# Patient Record
Sex: FEMALE | Race: WHITE | NOT HISPANIC OR LATINO
[De-identification: names, ages, dates, MRNs, and addresses within clinical notes are randomized per-mention and may not be internally consistent; named-entity substitution may affect disease eponyms.]

---

## 2022-07-05 PROBLEM — Z00.00 ENCOUNTER FOR PREVENTIVE HEALTH EXAMINATION: Status: ACTIVE | Noted: 2022-07-05

## 2022-07-06 ENCOUNTER — NON-APPOINTMENT (OUTPATIENT)
Age: 62
End: 2022-07-06

## 2022-07-11 ENCOUNTER — APPOINTMENT (OUTPATIENT)
Dept: BREAST CENTER | Facility: CLINIC | Age: 62
End: 2022-07-11

## 2022-07-11 VITALS
DIASTOLIC BLOOD PRESSURE: 67 MMHG | SYSTOLIC BLOOD PRESSURE: 121 MMHG | HEIGHT: 64 IN | BODY MASS INDEX: 26.29 KG/M2 | WEIGHT: 154 LBS | OXYGEN SATURATION: 96 % | HEART RATE: 73 BPM

## 2022-07-11 DIAGNOSIS — Z80.3 FAMILY HISTORY OF MALIGNANT NEOPLASM OF BREAST: ICD-10-CM

## 2022-07-11 DIAGNOSIS — Z80.41 FAMILY HISTORY OF MALIGNANT NEOPLASM OF OVARY: ICD-10-CM

## 2022-07-11 PROCEDURE — 99204 OFFICE O/P NEW MOD 45 MIN: CPT

## 2022-07-11 RX ORDER — TOPIRAMATE 25 MG/1
25 TABLET, FILM COATED ORAL
Qty: 90 | Refills: 0 | Status: ACTIVE | COMMUNITY
Start: 2021-10-20

## 2022-07-11 RX ORDER — APIXABAN 5 MG/1
5 TABLET, FILM COATED ORAL
Qty: 180 | Refills: 0 | Status: ACTIVE | COMMUNITY
Start: 2022-03-14

## 2022-07-11 NOTE — PAST MEDICAL HISTORY
[Postmenopausal] : The patient is postmenopausal [Menarche Age ____] : age at menarche was [unfilled] [Menopause Age____] : age at menopause was [unfilled] [Total Preg ___] : G[unfilled] [Live Births ___] : P[unfilled]  [Age At Live Birth ___] : Age at live birth: [unfilled] [History of Hormone Replacement Treatment] : has no history of hormone replacement treatment [de-identified] : s/p TABBY/BSO for cervical cancer [id number]

## 2022-07-11 NOTE — HISTORY OF PRESENT ILLNESS
[FreeTextEntry1] : The patient is a 61-year-old G1, P1 postmenopausal white female of Ashkenazi Anabaptism descent.  She underwent menarche at age 13 and had her first child at age 34.  She underwent menopause when she had a radical TABBY/BSO in  at the time of her  when she had a significant stage III cervical cancer.  She was on Premarin for 5 years after the TABBY/BSO and then took tamoxifen for 5 years after that.  She has a strong family history of breast and ovarian cancer with her mother who  of ovarian cancer at age 45 and her maternal aunt who  from metastatic breast cancer at age 50.  The patient herself was diagnosed with a left breast upper outer quadrant breast cancer in  and underwent a left breast partial mastectomy and sentinel lymph node biopsy by Dr. Ramos at [facility] on 2013 for 1.6 cm moderately differentiated invasive duct cancer with 2 negative sentinel lymph nodes which was ER/MO strongly positive and HER2 positive by FISH with a Ki-67 of 25%.  She had a FISH ratio of 2.3 and a total HER2 count of 7.4.  The patient underwent chemotherapy at [facility] through Dr. Cotto and had Taxol with a full year of Herceptin but she refused any radiation therapy or endocrine therapy.  She then had an abnormal MRI on May 18, 2016 with a suspicious mass in the wide excision site in the left breast 1:00 region which was also seen on ultrasound is an 8 mm density and ultrasound core biopsy on May 18, 2016 showed a recurrent moderately differentiated invasive duct cancer which was again ER/MO positive and HER2 equivocal by IHC but turned out to be negative by FISH with a ratio of 1.3 and total HER2 count of 3.5.  She then tested BRCA2 positive with a 6174 deletion T mutation.  PET scan at that time showed no distant disease.  A mammography showed some other calcifications in the left breast but the patient decided to undergo bilateral mastectomies with bilateral nipple sparing technique with a prophylactic right breast mastectomy and direct to implant reconstruction with a repeat left axillary sentinel lymph node biopsy which was performed on 2016.  Final pathology showed 2 negative sentinel lymph nodes in the right breast was negative.  The left breast did show some foci of invasive duct cancer measuring 1 cm, 3 mm, and 1 cm respectively.  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  Some of the cancer was found in detached fragments which was on the anterior margin underneath the previous wide excision site.  I removed the further anterior margin which was completely negative so the final margins were all negative.  Final pathology showed the cancer to be ER/MO positive HER2 equivocal by IHC with a Ki-67 of 25% with a FISH ratio of 1.3 and total HER2 count of 3.5.  The patient saw Dr. Carrera at [facility] and an Oncotype Dx recurrence score was 22.  She underwent CMF chemotherapy which finished on 2016.  She later had her implants exchanged for cosmetic reasons with textured implants by Dr. Durán.  She was placed on toremifene.  She now comes in and underwent a right upper lobe lung resection for lung cancer by Dr. Zacarias Frye performed in .  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  In 2021, she was admitted with a TIA and vertigo and found to have a brain aneurysm.  She also had a short bout of A. fib and has been on Eliquis.  She had a brain aneurysm clipping at [facility] in 2022.  She has been seeing Dr. Watson at [facility] and has remained on toremifene since  and comes in now to discuss her endocrine therapy due to her recent history of TIA/stroke

## 2022-07-11 NOTE — REASON FOR VISIT
[Initial Eval - Existing Diagnosis] : an initial evaluation of an existing diagnosis [FreeTextEntry1] : The patient is of Ashkenazi Jehovah's witness descent with a family history of her mother who  of ovarian cancer at age 45 and maternal aunt who  of metastatic breast cancer at age 50.  The patient herself was diagnosed with a left breast upper outer quadrant breast cancer in  and underwent a partial mastectomy and sentinel lymph node biopsy in 2013 by Dr. Jo in [facility] for 1.6 cm moderately differentiated ductal cancer with 2 negative nodes which was triple positive for which she underwent chemotherapy with Taxol and a year of Herceptin at [facility] with Dr. Cotto.  She refused radiation therapy and endocrine therapy and eventually developed a recurrence in the left breast upper outer quadrant diagnosed after ultrasound-guided core biopsy in May 2016 and this cancer was ER/ND positive and HER2/valentino equivocal by IHC but negative by FISH.  She tested BRCA2 positive and underwent bilateral nipple sparing mastectomies with a prophylactic right breast mastectomy and repeat left axillary sentinel lymph node biopsy with direct to implant reconstruction on 2016.  Final pathology showed some residual foci of invasive duct cancer with the largest measuring 1 cm and 2 negative sentinel lymph nodes and Oncotype recurrence score was 22 and she was treated with CMF chemotherapy by Dr. Carrera at [facility].  She had her implants exchanged for cosmetic reasons by Dr. Durán and now comes in for interval follow-up.

## 2022-07-11 NOTE — ASSESSMENT
[FreeTextEntry1] : The patient is a 61-year-old G1, P1 postmenopausal white female of Ashkenazi Religion descent.  She underwent menarche at age 13 and had her first child at age 34.  She underwent menopause when she had a radical TABBY/BSO in  at the time of her  when she had a significant stage III cervical cancer.  She was on Premarin for 5 years after the TABBY/BSO and then took tamoxifen for 5 years after that.  She has a strong family history of breast and ovarian cancer with her mother who  of ovarian cancer at age 45 and her maternal aunt who  from metastatic breast cancer at age 50.  The patient herself was diagnosed with a left breast upper outer quadrant breast cancer in  and underwent a left breast partial mastectomy and sentinel lymph node biopsy by Dr. Ramos at [facility] on 2013 for 1.6 cm moderately differentiated invasive duct cancer with 2 negative sentinel lymph nodes which was ER/OH strongly positive and HER2 positive by FISH with a Ki-67 of 25%.  She had a FISH ratio of 2.3 and a total HER2 count of 7.4.  The patient underwent chemotherapy at [facility] through Dr. Cotto and had Taxol with a full year of Herceptin but she refused any radiation therapy or endocrine therapy.  She then had an abnormal MRI on May 18, 2016 with a suspicious mass in the wide excision site in the left breast 1:00 region which was also seen on ultrasound is an 8 mm density and ultrasound core biopsy on May 18, 2016 showed a recurrent moderately differentiated invasive duct cancer which was again ER/OH positive and HER2 equivocal by IHC but turned out to be negative by FISH with a ratio of 1.3 and total HER2 count of 3.5.  She then tested BRCA2 positive with a 6174 deletion T mutation.  PET scan at that time showed no distant disease.  A mammography showed some other calcifications in the left breast but the patient decided to undergo bilateral mastectomies with bilateral nipple sparing technique with a prophylactic right breast mastectomy and direct to implant reconstruction with a repeat left axillary sentinel lymph node biopsy which was performed on 2016.  Final pathology showed 2 negative sentinel lymph nodes in the right breast was negative.  The left breast did show some foci of invasive duct cancer measuring 1 cm, 3 mm, and 1 cm respectively.  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  Some of the cancer was found in detached fragments which was on the anterior margin underneath the previous wide excision site.  I removed the further anterior margin which was completely negative so the final margins were all negative.  Final pathology showed the cancer to be ER/OH positive HER2 equivocal by IHC with a Ki-67 of 25% with a FISH ratio of 1.3 and total HER2 count of 3.5.  The patient saw Dr. Carrera at [facility] and an Oncotype Dx recurrence score was 22.  She underwent CMF chemotherapy which finished on 2016.  She later had her implants exchanged for cosmetic reasons with textured implants by Dr. Durán.  She was placed on toremifene. She now comes in and underwent a right upper lobe lung resection for lung cancer by Dr. Zacarias Frye performed in .  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  In 2021, she was admitted with a TIA and vertigo and found to have a brain aneurysm.  She also had a short bout of A. fib and has been on Eliquis.  She had a brain aneurysm clipping at [facility] in 2022.  She has been seeing Dr. Watson at [facility] and has remained on toremifene since  and comes in now to discuss her endocrine therapy due to her recent history of TIA/stroke.  Dr. Watson sent out a breast index score which was high at 6.7.  He felt she needed to stay on endocrine therapy and wanted her to come off of the toremifene given the stroke history.  I would be in general agreement with this but the patient is absolutely refusing an AI.  The other option would be for her just to come off of the toremifene which she has been on for now 6 years.  The patient is very concerned about vaginal atrophy and she could possibly use an AI with some vaginal estrogen creams.  Overall, the patient is very anxious and I would like her to see another medical oncologist since she is losing confidence in Dr. Watson at [facility].  I have referred her to Heidi Gregory at [facility].  From a breast cancer standpoint she is doing really well and on exam has no suspicious findings over either implant with no evidence of recurrence over the left implant.  She does have textured implants and understands the increased risks of ALCL but does not want to undergo further surgery.  I can just see her again on a yearly basis and she will discuss her endocrine therapy issues with Dr. Heidi Gregory.

## 2022-07-11 NOTE — REVIEW OF SYSTEMS
[Feeling Tired] : feeling tired [Palpitations] : palpitations [Depression] : depression [Negative] : Neurological [de-identified] : Sure of TIA/stroke with brain aneurysm clipping

## 2022-08-31 ENCOUNTER — RESULT REVIEW (OUTPATIENT)
Age: 62
End: 2022-08-31

## 2022-08-31 ENCOUNTER — APPOINTMENT (OUTPATIENT)
Dept: HEMATOLOGY ONCOLOGY | Facility: CLINIC | Age: 62
End: 2022-08-31

## 2022-08-31 VITALS
SYSTOLIC BLOOD PRESSURE: 120 MMHG | WEIGHT: 150 LBS | RESPIRATION RATE: 18 BRPM | HEIGHT: 64 IN | DIASTOLIC BLOOD PRESSURE: 64 MMHG | TEMPERATURE: 97.9 F | HEART RATE: 69 BPM | BODY MASS INDEX: 25.61 KG/M2 | OXYGEN SATURATION: 96 %

## 2022-08-31 DIAGNOSIS — Z86.03 PERSONAL HISTORY OF NEOPLASM OF UNCERTAIN BEHAVIOR: ICD-10-CM

## 2022-08-31 DIAGNOSIS — Z85.3 PERSONAL HISTORY OF MALIGNANT NEOPLASM OF BREAST: ICD-10-CM

## 2022-08-31 DIAGNOSIS — Z85.41 PERSONAL HISTORY OF MALIGNANT NEOPLASM OF CERVIX UTERI: ICD-10-CM

## 2022-08-31 DIAGNOSIS — Z85.118 PERSONAL HISTORY OF OTHER MALIGNANT NEOPLASM OF BRONCHUS AND LUNG: ICD-10-CM

## 2022-08-31 DIAGNOSIS — Z87.891 PERSONAL HISTORY OF NICOTINE DEPENDENCE: ICD-10-CM

## 2022-08-31 PROCEDURE — 99205 OFFICE O/P NEW HI 60 MIN: CPT | Mod: 25

## 2022-08-31 PROCEDURE — 36415 COLL VENOUS BLD VENIPUNCTURE: CPT

## 2022-08-31 NOTE — ASSESSMENT
[FreeTextEntry1] : #Breast cancer\par 2013- left breast upper outer IDC; s/p partial mastectomy + sentinel lymph node biopsy Dr. Jo ([place])\par 1.6cm moderately differentiated IDC with 2 negative nodes, triple positive, s/p taxol + [place]ceptin [place]12 [place]par She refused XRT and endocrine therapy and eventually developed recurrence in the left breast upper outer quadrant\par 2016 ER/DC positive, HER 2 equivical by IHC, negative by FISH; BRCA positive with 6174 deletion T mutation\par s/p bilateral nipple sparing mastectomies, repeat left sentinel node biopsy, with direct to implant reconstruction\par Path: residual foci IDC, largest measuring 1cm and 2 negative nodes, oncotype 22\par s/p CMF Dr. Carrera ([place]) 12/2016,  patient not willing to take AI or tamoxifen and placed on toremifene \par s/p implant exchange Dr. Durán \par Patient refuses AI or Tamoxifen. She has been on Tormefine in the past. We have discussed that this is typically not the medication we use in the adjuvant setting and that this is used in the metastatic setting. We have also discussed that this increases her risk of VTE and TIA/stroke. She understands her risk and would still like to proceed. I have advised that she remain on Eliquis while she is on this medication. I do believe that given her history of ER+ breast cancer that she would benefit from Endocrine therapy and she completely understands the risks but wants to proceed with Tormefine. \par \par #BRCA\par MRI abd for Pancreatic eval - pending\par Breast MRI also pending\par Derm - had yearly skin eval May 2022\par S/p mastectomy and TAHBSO\par \par #lung cancer\par Stage I \par Dr. Frye\par \par # TIA likely 2/2 afib\par on eliquis\par \par Follow in 3 months with cbc with diff, cmp, Vit D

## 2022-08-31 NOTE — REASON FOR VISIT
[Initial Consultation] : an initial consultation [FreeTextEntry2] : endocrine therapy consultation following TIA

## 2022-08-31 NOTE — HISTORY OF PRESENT ILLNESS
[de-identified] : Ms. Roman is a 61 year old woman who presents for endocrine therapy consultation. Patient is on toremifene because she takes wellbutrin which is contraindicated with tamoxifen. Patient is seeing us today because her current oncologist does not want her on toremifene and wants to place her on an AI but patient is refusing. Patient states that last week she experienced her first migraine since her brain surgery and states that she left a note on her night stand for her daughter because the patient did not believe she would wake up. Patient states that she has PTSD due to her medical history. Patients goal is to be treated by Dr. Gregory Because she is passionate about her quality of life over her quantity. She wishes to be viewed as a whole person and is advocating to be placed back on toremifene. \par \par DEXA shows osteopenia- patient denied Fosamax \par \par Patient had a tumor removed from her chest cavity at 15. Cervical Ca dx 10 weeks into her 1st pregnancy at 34 years old. \par \par 2013- left breast upper outer IDC; s/p partial mastectomy + sentinel lymph node biopsy Dr. Jo ([place])\par 1.6cm moderately differentiated IDC with 2 negative nodes, triple positive, s/p taxol + herceptin x12 months Dr. Cotto ([facility]par She refused XRT and endocrine therapy and eventually developed recurrence in the left breast upper outer quadrant\par \par 2016 ER/WI positive, HER 2 equivical by IHC, negative by FISH; BRCA positive with 6174 deletion T mutation\par s/p bilateral nipple sparing mastectomies, repeat left sentinel node biopsy, with direct to implant reconstruction\par Path: residual foci IDC, largest measuring 1cm and 2 negative nodes, oncotype 22\par s/p CMF Dr. Carrera ([place]) 12/2016,  patient not willing to take AI or tamoxifen and placed on toremifene \par s/p implant exchange Dr. Durán \par \par 2020 s/p RUL resection Dr. Zacarias Frye\par \par 10/2021 admitted for TIA and vertigo given TPA but found to have brain aneurysm; placed on eliquis\par \par 2/2022 brain aneurysm clipping at [facility] followed by Dr. Lee -patient has 3 brain aneurysms total \par \par neurologist- essential hand tremor, intercostal neuralgia d/t sx's and scar tissue - on Topamax \par \par Age of Menarche: 13\par Age of Menaopause: s/p TABBY/BSO 1995 for stage III cervical cancer\par OCP/HRT: premarin x5 years post TABBY, tamoxifen x 5 years\par \par BRCA 2 positive

## 2022-08-31 NOTE — CONSULT LETTER
[Dear  ___] : Dear  [unfilled], [Consult Letter:] : I had the pleasure of evaluating your patient, [unfilled]. [Please see my note below.] : Please see my note below. [Consult Closing:] : Thank you very much for allowing me to participate in the care of this patient.  If you have any questions, please do not hesitate to contact me. [Sincerely,] : Sincerely, [FreeTextEntry3] : Heidi Gregory DO, FACJOSELITO, FACP\par Medical Oncology and Hematology\par [facility] [place]\par

## 2022-09-27 ENCOUNTER — NON-APPOINTMENT (OUTPATIENT)
Age: 62
End: 2022-09-27

## 2022-11-30 NOTE — ASSESSMENT
[FreeTextEntry1] : #Breast cancer\par 2013- left breast upper outer IDC; s/p partial mastectomy + sentinel lymph node biopsy Dr. Jo ([place])\par 1.6cm moderately differentiated IDC with 2 negative nodes, triple positive, s/p taxol + [place]12 [place]par She refused XRT and endocrine therapy and eventually developed recurrence in the left breast upper outer quadrant\par 2016 ER/GA positive, HER 2 equivical by IHC, negative by FISH; BRCA positive with 6174 deletion T mutation\par s/p bilateral nipple sparing mastectomies, repeat left sentinel node biopsy, with direct to implant reconstruction\par Path: residual foci IDC, largest measuring 1cm and 2 negative nodes, oncotype 22\par s/p CMF Dr. Carrera ([place]) 12/2016,  patient not willing to take AI or tamoxifen and placed on toremifene \par s/p implant exchange Dr. Durán \par Patient refuses AI or Tamoxifen. She has been on Tormefine in the past. We have discussed that this is typically not the medication we use in the adjuvant setting and that this is used in the metastatic setting. We have also discussed that this increases her risk of VTE and TIA/stroke. She understands her risk and would still like to proceed. I have advised that she remain on Eliquis while she is on this medication. I do believe that given her history of ER+ breast cancer that she would benefit from Endocrine therapy and she completely understands the risks but wants to proceed with Tormefine. \par \par #BRCA\par MRI abd for Pancreatic eval - pending\par Breast MRI also pending\par Derm - had yearly skin eval May 2022\par S/p mastectomy and TAHBSO\par \par #lung cancer\par Stage I \par Dr. Frye\par \par # TIA likely 2/2 afib\par on eliquis\par \par Follow in 3 months with cbc with diff, cmp, Vit D

## 2022-11-30 NOTE — CONSULT LETTER
[Dear  ___] : Dear  [unfilled], [Consult Letter:] : I had the pleasure of evaluating your patient, [unfilled]. [Please see my note below.] : Please see my note below. [Consult Closing:] : Thank you very much for allowing me to participate in the care of this patient.  If you have any questions, please do not hesitate to contact me. [Sincerely,] : Sincerely, [FreeTextEntry3] : Heidi Greogry DO, FACJOSELITO, FACP\par Medical Oncology and Hematology\par [facility] [place]\par

## 2022-11-30 NOTE — HISTORY OF PRESENT ILLNESS
[de-identified] : Ms. Roman is a 61 year old woman who presents for endocrine therapy consultation. Patient is on toremifene because she takes wellbutrin which is contraindicated with tamoxifen. Patient is seeing us today because her current oncologist does not want her on toremifene and wants to place her on an AI but patient is refusing. Patient states that last week she experienced her first migraine since her brain surgery and states that she left a note on her night stand for her daughter because the patient did not believe she would wake up. Patient states that she has PTSD due to her medical history. Patients goal is to be treated by Dr. Gregory Because she is passionate about her quality of life over her quantity. She wishes to be viewed as a whole person and is advocating to be placed back on toremifene. \par \par DEXA shows osteopenia- patient denied Fosamax \par \par Patient had a tumor removed from her chest cavity at 15. Cervical Ca dx 10 weeks into her 1st pregnancy at 34 years old. \par \par 2013- left breast upper outer IDC; s/p partial mastectomy + sentinel lymph node biopsy Dr. Jo ([place])\par 1.6cm moderately differentiated IDC with 2 negative nodes, triple positive, s/p taxol + [place]12 [place] [place] She refused XRT and endocrine therapy and eventually developed recurrence in the left breast upper outer quadrant\par \par 2016 ER/ID positive, HER 2 equivical by IHC, negative by FISH; BRCA positive with 6174 deletion T mutation\par s/p bilateral nipple sparing mastectomies, repeat left sentinel node biopsy, with direct to implant reconstruction\par Path: residual foci IDC, largest measuring 1cm and 2 negative nodes, oncotype 22\par s/p CMF Dr. Carrera ([place]) 12/2016,  patient not willing to take AI or tamoxifen and placed on toremifene \par s/p implant exchange Dr. Durán \par \par 2020 s/p RUL resection Dr. Zacarias Frye\par \par 10/2021 admitted for TIA and vertigo given TPA but found to have brain aneurysm; placed on eliquis\par \par 2/2022 brain aneurysm clipping at [facility] followed by Dr. Lee -patient has 3 brain aneurysms total \par \par neurologist- essential hand tremor, intercostal neuralgia d/t sx's and scar tissue - on Topamax \par \par Age of Menarche: 13\par Age of Menaopause: s/p TABBY/BSO 1995 for stage III cervical cancer\par OCP/HRT: premarin x5 years post TABBY, tamoxifen x 5 years\par \par BRCA 2 positive

## 2022-12-05 ENCOUNTER — APPOINTMENT (OUTPATIENT)
Dept: HEMATOLOGY ONCOLOGY | Facility: CLINIC | Age: 62
End: 2022-12-05

## 2022-12-23 ENCOUNTER — RESULT REVIEW (OUTPATIENT)
Age: 62
End: 2022-12-23

## 2022-12-23 ENCOUNTER — APPOINTMENT (OUTPATIENT)
Dept: HEMATOLOGY ONCOLOGY | Facility: CLINIC | Age: 62
End: 2022-12-23

## 2022-12-23 VITALS
WEIGHT: 147 LBS | BODY MASS INDEX: 25.1 KG/M2 | SYSTOLIC BLOOD PRESSURE: 118 MMHG | TEMPERATURE: 97.4 F | HEART RATE: 74 BPM | DIASTOLIC BLOOD PRESSURE: 54 MMHG | RESPIRATION RATE: 16 BRPM | HEIGHT: 64 IN | OXYGEN SATURATION: 98 %

## 2022-12-23 PROCEDURE — 99214 OFFICE O/P EST MOD 30 MIN: CPT | Mod: 25

## 2022-12-23 PROCEDURE — 36415 COLL VENOUS BLD VENIPUNCTURE: CPT

## 2023-01-06 NOTE — CONSULT LETTER
[Dear  ___] : Dear  [unfilled], [Consult Letter:] : I had the pleasure of evaluating your patient, [unfilled]. [Please see my note below.] : Please see my note below. [Consult Closing:] : Thank you very much for allowing me to participate in the care of this patient.  If you have any questions, please do not hesitate to contact me. [Sincerely,] : Sincerely, [FreeTextEntry3] : Heidi Gregory DO, FACJOSELITO, FACP\par Medical Oncology and Hematology\par [facility] [place]\par

## 2023-01-06 NOTE — HISTORY OF PRESENT ILLNESS
[de-identified] : Ms. Roman is a 61 year old woman who presents for endocrine therapy consultation. Patient is on toremifene because she takes wellbutrin which is contraindicated with tamoxifen. Patient is seeing us today because her current oncologist does not want her on toremifene and wants to place her on an AI but patient is refusing. Patient states that last week she experienced her first migraine since her brain surgery and states that she left a note on her night stand for her daughter because the patient did not believe she would wake up. Patient states that she has PTSD due to her medical history. Patients goal is to be treated by Dr. Gregory Because she is passionate about her quality of life over her quantity. She wishes to be viewed as a whole person and is advocating to be placed back on toremifene. \par \par DEXA shows osteopenia- patient denied Fosamax \par \par Patient had a tumor removed from her chest cavity at 15. Cervical Ca dx 10 weeks into her 1st pregnancy at 34 years old. \par \par 2013- left breast upper outer IDC; s/p partial mastectomy + sentinel lymph node biopsy Dr. Jo ([place])\par 1.6cm moderately differentiated IDC with 2 negative nodes, triple positive, s/p taxol + [place]12 [place] [place] She refused XRT and endocrine therapy and eventually developed recurrence in the left breast upper outer quadrant\par \par 2016 ER/WI positive, HER 2 equivical by IHC, negative by FISH; BRCA positive with 6174 deletion T mutation\par s/p bilateral nipple sparing mastectomies, repeat left sentinel node biopsy, with direct to implant reconstruction\par Path: residual foci IDC, largest measuring 1cm and 2 negative nodes, oncotype 22\par s/p CMF Dr. Carrera ([place]) 12/2016,  patient not willing to take AI or tamoxifen and placed on toremifene \par s/p implant exchange Dr. Durán \par \par 2020 s/p RUL resection Dr. Zacarias Frye\par \par 10/2021 admitted for TIA and vertigo given TPA but found to have brain aneurysm; placed on eliquis\par \par 2/2022 brain aneurysm clipping at [facility] followed by Dr. 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

## 2023-01-06 NOTE — ASSESSMENT
[FreeTextEntry1] : #Breast cancer\par 2013- left breast upper outer IDC; s/p partial mastectomy + sentinel lymph node biopsy Dr. Jo ([place])\par 1.6cm moderately differentiated IDC with 2 negative nodes, triple positive, s/p taxol + herceptin x12 months Dr. Cotto ([facility] [place]par She refused XRT and endocrine therapy and eventually developed recurrence in the left breast upper outer quadrant\par 2016 ER/PA positive, HER 2 equivical by IHC, negative by FISH; BRCA positive with 6174 deletion T mutation\par s/p bilateral nipple sparing mastectomies, repeat left sentinel node biopsy, with direct to implant reconstruction\par Path: residual foci IDC, largest measuring 1cm and 2 negative nodes, oncotype 22\par s/p CMF Dr. Carrera ([place]) 12/2016,  patient not willing to take AI or tamoxifen and placed on toremifene \par s/p implant exchange Dr. Durán \par Patient refuses AI or Tamoxifen. She has been on Tormefine in the past. We have discussed that this is typically not the medication we use in the adjuvant setting and that this is used in the metastatic setting. We have also discussed that this increases her risk of VTE and TIA/stroke. She understands her risk and would still like to proceed. I have advised that she remain on Eliquis while she is on this medication. I do believe that given her history of ER+ breast cancer that she would benefit from Endocrine therapy and she completely understands the risks but wants to proceed with Tormefine. \par continue tormefine. MRI breast 10/22 - reviewed- no evidence of malignancy. repeat due 10/2023.\par \par #BRCA 2\par MRI abd for Pancreatic eval - normal pancreas.  MRI abd - normal pancreas. No suspicious findings\par Breast MRI -MRI breast 10/22 - reviewed- no evidence of malignancy.\par Derm - had yearly skin eval May 2022 - advised to follow up again given growth on eyebrow for evaluation\par S/p mastectomy and TAHBSO\par \par #lung cancer\par Stage I \par Dr. Frye\par \par # TIA likely 2/2 afib\par on eliquis\par \par #osteoporosis\par taking ca++ and vit D\par does not want bisphosphonates (oral, SQ or IV)\par \par Follow in 3 months with cbc with diff, cmp, Vit D

## 2023-03-27 ENCOUNTER — APPOINTMENT (OUTPATIENT)
Dept: HEMATOLOGY ONCOLOGY | Facility: CLINIC | Age: 63
End: 2023-03-27
Payer: COMMERCIAL

## 2023-03-27 ENCOUNTER — RESULT REVIEW (OUTPATIENT)
Age: 63
End: 2023-03-27

## 2023-03-27 ENCOUNTER — APPOINTMENT (OUTPATIENT)
Dept: HEMATOLOGY ONCOLOGY | Facility: CLINIC | Age: 63
End: 2023-03-27

## 2023-03-27 VITALS
HEART RATE: 74 BPM | OXYGEN SATURATION: 98 % | DIASTOLIC BLOOD PRESSURE: 53 MMHG | WEIGHT: 147 LBS | BODY MASS INDEX: 25.1 KG/M2 | RESPIRATION RATE: 16 BRPM | TEMPERATURE: 97.6 F | HEIGHT: 64 IN | SYSTOLIC BLOOD PRESSURE: 120 MMHG

## 2023-03-27 VITALS
WEIGHT: 147 LBS | SYSTOLIC BLOOD PRESSURE: 120 MMHG | RESPIRATION RATE: 16 BRPM | HEIGHT: 64 IN | HEART RATE: 74 BPM | DIASTOLIC BLOOD PRESSURE: 53 MMHG | OXYGEN SATURATION: 98 % | TEMPERATURE: 97.6 F | BODY MASS INDEX: 25.1 KG/M2

## 2023-03-27 DIAGNOSIS — G45.9 TRANSIENT CEREBRAL ISCHEMIC ATTACK, UNSPECIFIED: ICD-10-CM

## 2023-03-27 PROCEDURE — 36415 COLL VENOUS BLD VENIPUNCTURE: CPT

## 2023-03-27 PROCEDURE — 99214 OFFICE O/P EST MOD 30 MIN: CPT | Mod: 25

## 2023-03-27 RX ORDER — ALBUTEROL SULFATE 90 UG/1
108 (90 BASE) INHALANT RESPIRATORY (INHALATION)
Qty: 8 | Refills: 0 | Status: ACTIVE | COMMUNITY
Start: 2023-03-14

## 2023-04-19 PROBLEM — G45.9 TIA (TRANSIENT ISCHEMIC ATTACK): Status: ACTIVE | Noted: 2022-08-29

## 2023-04-19 NOTE — CONSULT LETTER
[FreeTextEntry3] : Heidi Gregory DO, FACJOSELITO, FACP\par Medical Oncology and Hematology\par [facility] [place]\par

## 2023-04-19 NOTE — ASSESSMENT
[FreeTextEntry1] : #Breast cancer\par 2013- left breast upper outer IDC; s/p partial mastectomy + sentinel lymph node biopsy Dr. Jo ([place])\par 1.6cm moderately differentiated IDC with 2 negative nodes, triple positive, s/p taxol + herceptin x12 months Dr. Cotto ([facility] [place]par She refused XRT and endocrine therapy and eventually developed recurrence in the left breast upper outer quadrant\par 2016 ER/OK positive, HER 2 equivocal by IHC, negative by FISH; BRCA positive with 6174 deletion T mutation\par s/p bilateral nipple sparing mastectomies, repeat left sentinel node biopsy, with direct to implant reconstruction\par Path: residual foci IDC, largest measuring 1cm and 2 negative nodes, oncotype 22\par s/p CMF Dr. Carrera ([place]) 12/2016,  patient not willing to take AI or tamoxifen and placed on toremifene \par s/p implant exchange Dr. Durán \par Patient refuses AI or Tamoxifen. She has been on Tormefine in the past. We have discussed that this is typically not the medication we use in the adjuvant setting and that this is used in the metastatic setting. We have also discussed that this increases her risk of VTE and TIA/stroke. She understands her risk and would still like to proceed. I have advised that she remain on Eliquis while she is on this medication. I do believe that given her history of ER+ breast cancer that she would benefit from Endocrine therapy and she completely understands the risks but wants to proceed with Tormefine. \par continue tormefine. MRI breast 10/22 - reviewed- no evidence of malignancy. repeat due 10/2023.\par \par #BRCA 2\par MRI abd for Pancreatic eval - normal pancreas.  MRI abd - normal pancreas. No suspicious findings\par Breast MRI -MRI breast 10/22 - reviewed- no evidence of malignancy.\par Derm - had yearly skin eval May 2022 - advised to follow up again given growth on eyebrow for evaluation\par S/p mastectomy and TAHBSO\par \par #lung cancer\par Stage I \par Dr. Frye\par \par # TIA likely 2/2 afib and aneurysm \par on eliquis\par \par #osteoporosis\par taking ca++ and vit D\par does not want bisphosphonates (oral, SQ or IV)\par \par Follow in 3 months with cbc with diff, cmp, Vit D

## 2023-04-19 NOTE — HISTORY OF PRESENT ILLNESS
[de-identified] : Ms. Roman is a 61 year old woman who presents for endocrine therapy consultation. Patient is on toremifene because she takes wellbutrin which is contraindicated with tamoxifen. Patient is seeing us today because her current oncologist does not want her on toremifene and wants to place her on an AI but patient is refusing. Patient states that last week she experienced her first migraine since her brain surgery and states that she left a note on her night stand for her daughter because the patient did not believe she would wake up. Patient states that she has PTSD due to her medical history. Patients goal is to be treated by Dr. Gregory Because she is passionate about her quality of life over her quantity. She wishes to be viewed as a whole person and is advocating to be placed back on toremifene. \par \par DEXA shows osteopenia- patient denied Fosamax \par \par Patient had a tumor removed from her chest cavity at 15. Cervical Ca dx 10 weeks into her 1st pregnancy at 34 years old. \par \par 2013- left breast upper outer IDC; s/p partial mastectomy + sentinel lymph node biopsy Dr. Jo ([place])\par 1.6cm moderately differentiated IDC with 2 negative nodes, triple positive, s/p taxol + herceptin x12 months Dr. Cotto ([facility] [place]par She refused XRT and endocrine therapy and eventually developed recurrence in the left breast upper outer quadrant\par \par 2016 ER/TN positive, HER 2 equivical by IHC, negative by FISH; BRCA positive with 6174 deletion T mutation\par s/p bilateral nipple sparing mastectomies, repeat left sentinel node biopsy, with direct to implant reconstruction\par Path: residual foci IDC, largest measuring 1cm and 2 negative nodes, oncotype 22\par s/p CMF Dr. Carrera ([place]) 12/2016,  patient not willing to take AI or tamoxifen and placed on toremifene \par s/p implant exchange Dr. Durán \par \par 2020 s/p RUL resection Dr. Zacarias Frye\par \par 10/2021 admitted for TIA and vertigo given TPA but found to have brain aneurysm; placed on eliquis\par \par 2/2022 brain aneurysm clipping at [facility] followed by Dr. 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

## 2023-05-04 NOTE — PHYSICAL EXAM
[Normocephalic] : normocephalic [Atraumatic] : atraumatic [EOMI] : extra ocular movement intact [Supple] : supple [No Supraclavicular Adenopathy] : no supraclavicular adenopathy [No Cervical Adenopathy] : no cervical adenopathy [Examined in the supine and seated position] : examined in the supine and seated position [No dominant masses] : no dominant masses in right breast  [No dominant masses] : no dominant masses left breast [No Nipple Retraction] : no left nipple retraction [No Nipple Discharge] : no left nipple discharge [Breast Mass Right Breast ___cm] : no masses [Breast Nipple Inversion] : nipples not inverted [Breast Mass Left Breast ___cm] : no masses [Breast Nipple Retraction] : nipples not retracted [Breast Nipple Flattening] : nipples not flattened [Breast Nipple Fissures] : nipples not fissured [Breast Abnormal Lactation (Galactorrhea)] : no galactorrhea [Breast Abnormal Secretion Bloody Fluid] : no bloody discharge [Breast Abnormal Secretion Serous Fluid] : no serous discharge [Breast Abnormal Secretion Opalescent Fluid] : no milky discharge [No Axillary Lymphadenopathy] : no left axillary lymphadenopathy [No Edema] : no edema [No Rashes] : no rashes [No Ulceration] : no ulceration [de-identified] : Status post prophylactic nipple sparing mastectomy with direct to implant reconstruction with no suspicious findings. [de-identified] : On exam, the patient has obvious bilateral nipple sparing mastectomies and direct implant reconstructions with this history of a recurrent left breast cancer and a BRCA2 positive patient.  I cannot feel any suspicious findings over either implant.  She has no axillary???????? , supraclavicular, or cervical adenopathy.  She has no sensation in either nipple areolar complex or in her inframammary folds.  She has good sensation on the superior, medial, and lateral poles. [de-identified] : Status post nipple sparing mastectomy with direct to implant reconstruction due to breast cancer recurrence in this BRCA2 positive patient with no evidence of recurrence.

## 2023-05-04 NOTE — HISTORY OF PRESENT ILLNESS
[FreeTextEntry1] : The patient is a 62-year-old G1, P1 postmenopausal white female of Ashkenazi Islam descent.  She underwent menarche at age 13 and had her first child at age 34.  She underwent menopause when she had a radical TABBY/BSO in  at the time of her  when she had a significant stage III cervical cancer.  She was on Premarin for 5 years after the TABBY/BSO and then took tamoxifen for 5 years after that.  She has a strong family history of breast and ovarian cancer with her mother who  of ovarian cancer at age 45 and her maternal aunt who  from metastatic breast cancer at age 50.  The patient herself was diagnosed with a left breast upper outer quadrant breast cancer in  and underwent a left breast partial mastectomy and sentinel lymph node biopsy by Dr. Ramos at [facility] on 2013 for 1.6 cm moderately differentiated invasive duct cancer with 2 negative sentinel lymph nodes which was ER/FL strongly positive and HER2 positive by FISH with a Ki-67 of 25%.  She had a FISH ratio of 2.3 and a total HER2 count of 7.4.  The patient underwent chemotherapy at [facility] through Dr. Cotto and had Taxol with a full year of Herceptin but she refused any radiation therapy or endocrine therapy.  She then had an abnormal MRI on May 18, 2016 with a suspicious mass in the wide excision site in the left breast 1:00 region which was also seen on ultrasound is an 8 mm density and ultrasound core biopsy on May 18, 2016 showed a recurrent moderately differentiated invasive duct cancer which was again ER/FL positive and HER2 equivocal by IHC but turned out to be negative by FISH with a ratio of 1.3 and total HER2 count of 3.5.  She then tested BRCA2 positive with a 6174 deletion T mutation.  PET scan at that time showed no distant disease.  A mammography showed some other calcifications in the left breast but the patient decided to undergo bilateral mastectomies with bilateral nipple sparing technique with a prophylactic right breast mastectomy and direct to implant reconstruction with a repeat left axillary sentinel lymph node biopsy which was performed on 2016.  Final pathology showed 2 negative sentinel lymph nodes in the right breast was negative.  The left breast did show some foci of invasive duct cancer measuring 1 cm, 3 mm, and 1 cm respectively.  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  Some of the cancer was found in detached fragments which was on the anterior margin underneath the previous wide excision site.  I removed the further anterior margin which was completely negative so the final margins were all negative.  Final pathology showed the cancer to be ER/FL positive HER2 equivocal by IHC with a Ki-67 of 25% with a FISH ratio of 1.3 and total HER2 count of 3.5.  The patient saw Dr. Carrera at [facility] and an Oncotype Dx recurrence score was 22.  She underwent CMF chemotherapy which finished on 2016.  She later had her implants exchanged for cosmetic reasons with textured implants by Dr. Durán.  She was placed on toremifene.  She now comes in and underwent a right upper lobe lung resection for lung cancer by Dr. Zacarias Frye performed in .  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  In 2021, she was admitted with a TIA and vertigo and found to have a brain aneurysm.  She also had a short bout of A. fib and has been on Eliquis.  She had a brain aneurysm clipping at [facility] in 2022.  She had been seeing Dr. Watson at [facility] and has remained on toremifene since  and has been recently following up with Dr. Gregory and Dr. Pascual and was advised to come off of the toremifene because of history of a TIA/stroke but the patient will only agree to a toremifene and has been kept on Eliquis while on the medication.  She has recently developed a recurrence along the staple line of her right lung cancer and is also been found to have some left axillary adenopathy and comes in now for a surgical evaluation of this adenopathy.

## 2023-05-04 NOTE — REASON FOR VISIT
[Follow-Up: _____] : a [unfilled] follow-up visit [FreeTextEntry1] : The patient is of Ashkenazi Catholic descent with a family history of her mother who  of ovarian cancer at age 45 and maternal aunt who  of metastatic breast cancer at age 50.  The patient herself was diagnosed with a left breast upper outer quadrant breast cancer in  and underwent a partial mastectomy and sentinel lymph node biopsy in 2013 by Dr. Jo in [facility] for 1.6 cm moderately differentiated ductal cancer with 2 negative nodes which was triple positive for which she underwent chemotherapy with Taxol and a year of Herceptin at [facility] with Dr. Cotto.  She refused radiation therapy and endocrine therapy and eventually developed a recurrence in the left breast upper outer quadrant diagnosed after ultrasound-guided core biopsy in May 2016 and this cancer was ER/OH positive and HER2/valentino equivocal by IHC but negative by FISH.  She tested BRCA2 positive and underwent bilateral nipple sparing mastectomies with a prophylactic right breast mastectomy and repeat left axillary sentinel lymph node biopsy with direct to implant reconstruction on 2016.  Final pathology showed some residual foci of invasive duct cancer with the largest measuring 1 cm and 2 negative sentinel lymph nodes and Oncotype recurrence score was 22 and she was treated with CMF chemotherapy by Dr. Carrera at [facility].  She had her implants exchanged for cosmetic reasons by Dr. Durán.  He underwent a right upper lobe lung resection by Dr. Zacarias Bunch in  for lung cancer. She now comes in for interval follow-up after she has been found to have a recurrence of her lung cancer along the staple line in the right lung and an enlarged left axillary lymph node on imaging.

## 2023-05-04 NOTE — PAST MEDICAL HISTORY
[Postmenopausal] : The patient is postmenopausal [Menarche Age ____] : age at menarche was [unfilled] [Menopause Age____] : age at menopause was [unfilled] [History of Hormone Replacement Treatment] : has no history of hormone replacement treatment [Total Preg ___] : G[unfilled] [Live Births ___] : P[unfilled]  [Age At Live Birth ___] : Age at live birth: [unfilled] [de-identified] : s/p TABBY/BSO for cervical cancer [id number]

## 2023-05-04 NOTE — ASSESSMENT
[FreeTextEntry1] : The patient is a 62-year-old G1, P1 postmenopausal white female of Ashkenazi Evangelical descent.  She underwent menarche at age 13 and had her first child at age 34.  She underwent menopause when she had a radical TABBY/BSO in  at the time of her  when she had a significant stage III cervical cancer.  She was on Premarin for 5 years after the TABBY/BSO and then took tamoxifen for 5 years after that.  She has a strong family history of breast and ovarian cancer with her mother who  of ovarian cancer at age 45 and her maternal aunt who  from metastatic breast cancer at age 50.  The patient herself was diagnosed with a left breast upper outer quadrant breast cancer in  and underwent a left breast partial mastectomy and sentinel lymph node biopsy by Dr. Ramos at [facility] on 2013 for 1.6 cm moderately differentiated invasive duct cancer with 2 negative sentinel lymph nodes which was ER/IN strongly positive and HER2 positive by FISH with a Ki-67 of 25%.  She had a FISH ratio of 2.3 and a total HER2 count of 7.4.  The patient underwent chemotherapy at [facility] through Dr. Cotto and had Taxol with a full year of Herceptin but she refused any radiation therapy or endocrine therapy.  She then had an abnormal MRI on May 18, 2016 with a suspicious mass in the wide excision site in the left breast 1:00 region which was also seen on ultrasound is an 8 mm density and ultrasound core biopsy on May 18, 2016 showed a recurrent moderately differentiated invasive duct cancer which was again ER/IN positive and HER2 equivocal by IHC but turned out to be negative by FISH with a ratio of 1.3 and total HER2 count of 3.5.  She then tested BRCA2 positive with a 6174 deletion T mutation.  PET scan at that time showed no distant disease.  A mammography showed some other calcifications in the left breast but the patient decided to undergo bilateral mastectomies with bilateral nipple sparing technique with a prophylactic right breast mastectomy and direct to implant reconstruction with a repeat left axillary sentinel lymph node biopsy which was performed on 2016.  Final pathology showed 2 negative sentinel lymph nodes in the right breast was negative.  The left breast did show some foci of invasive duct cancer measuring 1 cm, 3 mm, and 1 cm respectively.  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  Some of the cancer was found in detached fragments which was on the anterior margin underneath the previous wide excision site.  I removed the further anterior margin which was completely negative so the final margins were all negative.  Final pathology showed the cancer to be ER/IN positive HER2 equivocal by IHC with a Ki-67 of 25% with a FISH ratio of 1.3 and total HER2 count of 3.5.  The patient saw Dr. Carrera at [facility] and an Oncotype Dx recurrence score was 22.  She underwent CMF chemotherapy which finished on 2016.  She later had her implants exchanged for cosmetic reasons with textured implants by Dr. Durán.  She was placed on toremifene. She now comes in and underwent a right upper lobe lung resection for lung cancer by Dr. Zacarias Frye performed in .  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  In 2021, she was admitted with a TIA and vertigo and found to have a brain aneurysm.  She also had a short bout of A. fib and has been on Eliquis.  She had a brain aneurysm clipping at [facility] in 2022.  She has been seeing Dr. Watson at [facility] and has remained on toremifene since  and comes in now to discuss her endocrine therapy due to her recent history of TIA/stroke.  Dr. Watson sent out a breast index score which was high at 6.7.  He felt she needed to stay on endocrine therapy and wanted her to come off of the toremifene given the stroke history.  I would be in general agreement with this but the patient is absolutely refusing an AI.  The other option would be for her just to come off of the toremifene which she has been on for now 6 years.  The patient is very concerned about vaginal atrophy and she could possibly use an AI with some vaginal estrogen creams.  She lost confidence in Dr. Watson and has been following up with Dr. Gregory and Dr. Pascual.  She refused to stop the toremifene despite the risk of TIA/stroke and she has been kept on Eliquis while on the medication. She unfortunately developed a recurrence along her right lung resection staple line and will likely be getting SBRT through Dr. Guzman.  She has been found to have some left axillary adenopathy on imaging.  From a breast cancer standpoint she is doing really well and on exam has no suspicious findings over either implant with no evidence of recurrence over the left implant.  ????? left ALN ????? She does have textured implants and understands the increased risks of ALCL but does not want to undergo further surgery.  I can just see her again on a ???????? yearly basis and she will discuss her endocrine therapy issues with Dr. Heidi Gregory.

## 2023-05-04 NOTE — REVIEW OF SYSTEMS
[Feeling Tired] : feeling tired [Palpitations] : palpitations [Depression] : depression [Negative] : Neurological [de-identified] : Sure of TIA/stroke with brain aneurysm clipping

## 2023-05-08 ENCOUNTER — APPOINTMENT (OUTPATIENT)
Dept: BREAST CENTER | Facility: CLINIC | Age: 63
End: 2023-05-08
Payer: COMMERCIAL

## 2023-05-08 VITALS
SYSTOLIC BLOOD PRESSURE: 101 MMHG | WEIGHT: 147 LBS | BODY MASS INDEX: 25.23 KG/M2 | DIASTOLIC BLOOD PRESSURE: 63 MMHG | OXYGEN SATURATION: 96 % | HEART RATE: 69 BPM

## 2023-05-08 DIAGNOSIS — R92.8 OTHER ABNORMAL AND INCONCLUSIVE FINDINGS ON DIAGNOSTIC IMAGING OF BREAST: ICD-10-CM

## 2023-05-08 PROCEDURE — 99214 OFFICE O/P EST MOD 30 MIN: CPT

## 2023-05-08 NOTE — ADDENDUM
[FreeTextEntry1] : I spent greater than 75% of consultation in face-to-face counseling and coordination of care for this patient with new likely recurrent right lung cancer with a suspicious lower left axillary lymph node seen on CAT scan and hypermetabolic on PET scan.

## 2023-05-08 NOTE — HISTORY OF PRESENT ILLNESS
[de-identified] : Patient is an 83-year-old male presenting for an annual exam. He's had no changes since his last presentation approximately a year ago. No hospitalizations serious illnesses, surgery, or trauma. His weight is up his lost weight, but has regained it. His appetite is good sleeping well eating well. No night sweats. No chills. No fever. He has had an elevated PSA in the past, but has not seen a urologist in some time over to being repeated at this, time. He's had no changes in his nocturia or urinary frequency. He does have hesitancy and frequently has to urinate twice. His PSA has been elevated in the past. He has hyperlipidemia and has had hypertension. His monitor his blood pressure has remained well. He takes his medications. His systems review is entirely negative at this point.
[FreeTextEntry1] : The patient is a 62-year-old G1, P1 postmenopausal white female of Ashkenazi Orthodox descent.  She underwent menarche at age 13 and had her first child at age 34.  She underwent menopause when she had a radical TABBY/BSO in  at the time of her  when she had a significant stage III cervical cancer.  She was on Premarin for 5 years after the TABBY/BSO and then took tamoxifen for 5 years after that.  She has a strong family history of breast and ovarian cancer with her mother who  of ovarian cancer at age 45 and her maternal aunt who  from metastatic breast cancer at age 50.  The patient herself was diagnosed with a left breast upper outer quadrant breast cancer in  and underwent a left breast partial mastectomy and sentinel lymph node biopsy by Dr. Ramos at [facility] on 2013 for 1.6 cm moderately differentiated invasive duct cancer with 2 negative sentinel lymph nodes which was ER/MD strongly positive and HER2 positive by FISH with a Ki-67 of 25%.  She had a FISH ratio of 2.3 and a total HER2 count of 7.4.  The patient underwent chemotherapy at [facility] through Dr. Cotto and had Taxol with a full year of Herceptin but she refused any radiation therapy or endocrine therapy.  She then had an abnormal MRI on May 18, 2016 with a suspicious mass in the wide excision site in the left breast 1:00 region which was also seen on ultrasound is an 8 mm density and ultrasound core biopsy on May 18, 2016 showed a recurrent moderately differentiated invasive duct cancer which was again ER/MD positive and HER2 equivocal by IHC but turned out to be negative by FISH with a ratio of 1.3 and total HER2 count of 3.5.  She then tested BRCA2 positive with a 6174 deletion T mutation.  PET scan at that time showed no distant disease.  A mammography showed some other calcifications in the left breast but the patient decided to undergo bilateral mastectomies with bilateral nipple sparing technique with a prophylactic right breast mastectomy and direct to implant reconstruction with a repeat left axillary sentinel lymph node biopsy which was performed on 2016.  Final pathology showed 2 negative sentinel lymph nodes in the right breast was negative.  The left breast did show some foci of invasive duct cancer measuring 1 cm, 3 mm, and 1 cm respectively.  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  Some of the cancer was found in detached fragments which was on the anterior margin underneath the previous wide excision site.  I removed the further anterior margin which was completely negative so the final margins were all negative.  Final pathology showed the cancer to be ER/MD positive HER2 equivocal by IHC with a Ki-67 of 25% with a FISH ratio of 1.3 and total HER2 count of 3.5.  The patient saw Dr. Carrera at [facility] and an Oncotype Dx recurrence score was 22.  She underwent CMF chemotherapy which finished on 2016.  She later had her implants exchanged for cosmetic reasons with textured implants by Dr. Durán.  She was placed on toremifene.  She then underwent a right upper lobe lung resection for lung cancer by Dr. Zacarias Bunch performed in .  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  In 2021, she was admitted with a TIA and vertigo and found to have a brain aneurysm.  She also had a short bout of A. fib and has been on Eliquis.  She had a brain aneurysm clipping at [facility] in 2022.  She had been seeing Dr. Watson at [facility] and has remained on toremifene since  and has now been following up with Dr. Gregory and Dr. Pascual and was advised to come off of the toremifene because of history of a TIA/stroke but the patient will only agree to a toremifene and has been kept on Eliquis while on the medication.  She has developed a recurrence along the staple line of her right lung cancer found on recent PET scan performed in May 2023 and CAT scan performed in 2023.  She also been found to have some left axillary adenopathy which is hypermetabolic on PET scan and she comes in now for a surgical evaluation of this adenopathy.

## 2023-05-08 NOTE — PHYSICAL EXAM
[Normocephalic] : normocephalic [Atraumatic] : atraumatic [EOMI] : extra ocular movement intact [Supple] : supple [No Supraclavicular Adenopathy] : no supraclavicular adenopathy [No Cervical Adenopathy] : no cervical adenopathy [Examined in the supine and seated position] : examined in the supine and seated position [No dominant masses] : no dominant masses in right breast  [No dominant masses] : no dominant masses left breast [No Nipple Retraction] : no left nipple retraction [No Nipple Discharge] : no left nipple discharge [Breast Mass Right Breast ___cm] : no masses [Breast Mass Left Breast ___cm] : no masses [No Axillary Lymphadenopathy] : no right axillary lymphadenopathy [No Edema] : no edema [No Rashes] : no rashes [No Ulceration] : no ulceration [Breast Nipple Inversion] : nipples not inverted [Breast Nipple Retraction] : nipples not retracted [Breast Nipple Flattening] : nipples not flattened [Breast Nipple Fissures] : nipples not fissured [Breast Abnormal Lactation (Galactorrhea)] : no galactorrhea [Breast Abnormal Secretion Bloody Fluid] : no bloody discharge [Breast Abnormal Secretion Serous Fluid] : no serous discharge [Breast Abnormal Secretion Opalescent Fluid] : no milky discharge [de-identified] : On exam, the patient has obvious bilateral nipple sparing mastectomies and direct implant reconstructions with this history of a recurrent left breast cancer and a BRCA2 positive patient.  I cannot feel any suspicious findings over either implant.  I can definitely feel a suspicious firm lower left axillary lymph node which will require a directed ultrasound and possible biopsy.  She has no supraclavicular or cervical adenopathy.  She has no sensation in either nipple areolar complex or in her inframammary folds.  She has good sensation on the superior, medial, and lateral poles. [de-identified] : Status post prophylactic nipple sparing mastectomy with direct to implant reconstruction with no suspicious findings. [de-identified] : Status post nipple sparing mastectomy with direct to implant reconstruction due to breast cancer recurrence in this BRCA2 positive patient with no evidence of recurrence. [de-identified] : Suspicious palpable firm freely mobile lower left axillary adenopathy

## 2023-05-08 NOTE — REASON FOR VISIT
[Follow-Up: _____] : a [unfilled] follow-up visit [FreeTextEntry1] : The patient is of Ashkenazi Sikhism descent with a family history of her mother who  of ovarian cancer at age 45 and maternal aunt who  of metastatic breast cancer at age 50.  The patient herself was diagnosed with a left breast upper outer quadrant breast cancer in  and underwent a partial mastectomy and sentinel lymph node biopsy in 2013 by Dr. Jo in [facility] for 1.6 cm moderately differentiated ductal cancer with 2 negative nodes which was triple positive for which she underwent chemotherapy with Taxol and a year of Herceptin at [facility] with Dr. Cotto.  She refused radiation therapy and endocrine therapy and eventually developed a recurrence in the left breast upper outer quadrant diagnosed after ultrasound-guided core biopsy in May 2016 and this cancer was ER/MI positive and HER2/valentino equivocal by IHC but negative by FISH.  She tested BRCA2 positive and underwent bilateral nipple sparing mastectomies with a prophylactic right breast mastectomy and repeat left axillary sentinel lymph node biopsy with direct to implant reconstruction on 2016.  Final pathology showed some residual foci of invasive duct cancer with the largest measuring 1 cm and 2 negative sentinel lymph nodes and Oncotype recurrence score was 22 and she was treated with CMF chemotherapy by Dr. Carrera at [facility].  She had her implants exchanged for cosmetic reasons by Dr. Durán.  He underwent a right upper lobe lung resection by Dr. Zacarias Bunch in  for lung cancer. She now comes in for interval follow-up after she has been found to have a recurrence of her lung cancer along the staple line in the right lung and an enlarged left axillary lymph node on imaging.

## 2023-05-08 NOTE — PAST MEDICAL HISTORY
[Postmenopausal] : The patient is postmenopausal [Menarche Age ____] : age at menarche was [unfilled] [Menopause Age____] : age at menopause was [unfilled] [Total Preg ___] : G[unfilled] [Live Births ___] : P[unfilled]  [Age At Live Birth ___] : Age at live birth: [unfilled] [History of Hormone Replacement Treatment] : has no history of hormone replacement treatment [de-identified] : s/p TABBY/BSO for cervical cancer [id number]

## 2023-05-08 NOTE — REVIEW OF SYSTEMS
[Feeling Tired] : feeling tired [Palpitations] : palpitations [Depression] : depression [Negative] : Neurological [de-identified] : Sure of TIA/stroke with brain aneurysm clipping

## 2023-05-08 NOTE — ASSESSMENT
[FreeTextEntry1] : Healthy adult male. No changes in his clinical status.
[FreeTextEntry1] : The patient is a 62-year-old G1, P1 postmenopausal white female of Ashkenazi Oriental orthodox descent.  She underwent menarche at age 13 and had her first child at age 34.  She underwent menopause when she had a radical TABBY/BSO in  at the time of her  when she had a significant stage III cervical cancer.  She was on Premarin for 5 years after the TABBY/BSO and then took tamoxifen for 5 years after that.  She has a strong family history of breast and ovarian cancer with her mother who  of ovarian cancer at age 45 and her maternal aunt who  from metastatic breast cancer at age 50.  The patient herself was diagnosed with a left breast upper outer quadrant breast cancer in  and underwent a left breast partial mastectomy and sentinel lymph node biopsy by Dr. Ramos at [facility] on 2013 for 1.6 cm moderately differentiated invasive duct cancer with 2 negative sentinel lymph nodes which was ER/OH strongly positive and HER2 positive by FISH with a Ki-67 of 25%.  She had a FISH ratio of 2.3 and a total HER2 count of 7.4.  The patient underwent chemotherapy at [facility] through Dr. Cotto and had Taxol with a full year of Herceptin but she refused any radiation therapy or endocrine therapy.  She then had an abnormal MRI on May 18, 2016 with a suspicious mass in the wide excision site in the left breast 1:00 region which was also seen on ultrasound is an 8 mm density and ultrasound core biopsy on May 18, 2016 showed a recurrent moderately differentiated invasive duct cancer which was again ER/OH positive and HER2 equivocal by IHC but turned out to be negative by FISH with a ratio of 1.3 and total HER2 count of 3.5.  She then tested BRCA2 positive with a 6174 deletion T mutation.  PET scan at that time showed no distant disease.  A mammography showed some other calcifications in the left breast but the patient decided to undergo bilateral mastectomies with bilateral nipple sparing technique with a prophylactic right breast mastectomy and direct to implant reconstruction with a repeat left axillary sentinel lymph node biopsy which was performed on 2016.  Final pathology showed 2 negative sentinel lymph nodes and the right breast was negative.  The left breast did show some foci of invasive duct cancer measuring 1 cm, 3 mm, and 1 cm respectively.  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  Some of the cancer was found in detached fragments which was on the anterior margin underneath the previous wide excision site.  I removed the further anterior margin which was completely negative so the final margins were all negative.  Final pathology showed the cancer to be ER/OH positive HER2 equivocal by IHC with a Ki-67 of 25% with a FISH ratio of 1.3 and total HER2 count of 3.5.  The patient saw Dr. Carrera at [facility] and an Oncotype Dx recurrence score was 22.  She underwent CMF chemotherapy which finished on 2016.  She later had her implants exchanged for cosmetic reasons with textured implants by Dr. Durán.  She was placed on toremifene. She now comes in and underwent a right upper lobe lung resection for lung cancer by Dr. Zacarias Bunch performed in .  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  In 2021, she was admitted with a TIA and vertigo and found to have a brain aneurysm.  She also had a short bout of A. fib and has been on Eliquis.  She had a brain aneurysm clipping at [facility] in 2022.  She was seen by Dr. Watson at [facility] and has remained on toremifene since  and came previously in  to discuss her endocrine therapy due to her history of TIA/stroke.  Dr. Watson sent out a breast index score which was high at 6.7.  He felt she needed to stay on endocrine therapy and wanted her to come off of the toremifene given the stroke history.  I would be in general agreement with this but the patient has absolutely been refusing an AI.  The other option would be for her just to come off of the toremifene which she has been on for many years.  The patient is very concerned about vaginal atrophy and she could possibly use an AI with some vaginal estrogen creams.  She lost confidence in Dr. Watson and has been following up with Dr. Gregory and Dr. Pascual.  She refused to stop the toremifene despite the risk of TIA/stroke and she has been kept on Eliquis while on the medication. She unfortunately developed a recurrence along her right lung resection staple line which was diagnosed on an abnormal PET scan from May 2, 2023 with also abnormal finding seen on CT scan of the chest performed on 2023.  Dr. Bunch feels this is unresectable and she will likely be getting SBRT through Dr. Guzman.  She has also been found to have some left axillary adenopathy on the CAT scan which is also hypermetabolic on PET scan.  From a breast cancer standpoint she is doing really well and on exam has no suspicious findings over either implant with no evidence of recurrence over the left implant.  She definitely has a palpable lower suspicious lower axillary lymph node however.  She does have textured implants and understands the increased risks of ALCL but does not want to undergo further surgery.  I had a long discussion with her and she is obviously very upset about this recurrent lung cancer and the suspicious lower left axilla lymph node.  She understands the need for histologic diagnosis and I will arrange a left axillary ultrasound and hopeful axillary lymph node biopsy on the same day.  I have informed Dr. Pascual about this patient's progress and will have her PET scan and CT scan downloaded into the PACS system for radiology and medical oncology to review.  I also did inform Dr. Guzman about the need for this lower left axillary lymph node biopsy prior to any lung SBRT.  I did tell her that if this left axillary lymph node biopsy shows breast cancer she may benefit from an axillary lymph node dissection and she is very upset about any possible further surgery or radiation which would lead to possible lymphedema.

## 2023-05-09 ENCOUNTER — APPOINTMENT (OUTPATIENT)
Dept: BREAST CENTER | Facility: CLINIC | Age: 63
End: 2023-05-09
Payer: COMMERCIAL

## 2023-05-10 ENCOUNTER — RESULT REVIEW (OUTPATIENT)
Age: 63
End: 2023-05-10

## 2023-05-10 ENCOUNTER — NON-APPOINTMENT (OUTPATIENT)
Age: 63
End: 2023-05-10

## 2023-05-15 ENCOUNTER — TRANSCRIPTION ENCOUNTER (OUTPATIENT)
Age: 63
End: 2023-05-15

## 2023-05-16 ENCOUNTER — NON-APPOINTMENT (OUTPATIENT)
Age: 63
End: 2023-05-16

## 2023-05-18 ENCOUNTER — NON-APPOINTMENT (OUTPATIENT)
Age: 63
End: 2023-05-18

## 2023-05-22 ENCOUNTER — NON-APPOINTMENT (OUTPATIENT)
Age: 63
End: 2023-05-22

## 2023-05-24 ENCOUNTER — NON-APPOINTMENT (OUTPATIENT)
Age: 63
End: 2023-05-24

## 2023-06-01 ENCOUNTER — NON-APPOINTMENT (OUTPATIENT)
Age: 63
End: 2023-06-01

## 2023-06-02 ENCOUNTER — NON-APPOINTMENT (OUTPATIENT)
Age: 63
End: 2023-06-02

## 2023-06-05 ENCOUNTER — NON-APPOINTMENT (OUTPATIENT)
Age: 63
End: 2023-06-05

## 2023-06-07 ENCOUNTER — NON-APPOINTMENT (OUTPATIENT)
Age: 63
End: 2023-06-07

## 2023-06-14 ENCOUNTER — RESULT REVIEW (OUTPATIENT)
Age: 63
End: 2023-06-14

## 2023-06-16 ENCOUNTER — NON-APPOINTMENT (OUTPATIENT)
Age: 63
End: 2023-06-16

## 2023-06-19 ENCOUNTER — RESULT REVIEW (OUTPATIENT)
Age: 63
End: 2023-06-19

## 2023-06-20 ENCOUNTER — APPOINTMENT (OUTPATIENT)
Dept: BREAST CENTER | Facility: HOSPITAL | Age: 63
End: 2023-06-20

## 2023-06-20 ENCOUNTER — RESULT REVIEW (OUTPATIENT)
Age: 63
End: 2023-06-20

## 2023-06-26 ENCOUNTER — NON-APPOINTMENT (OUTPATIENT)
Age: 63
End: 2023-06-26

## 2023-06-27 ENCOUNTER — NON-APPOINTMENT (OUTPATIENT)
Age: 63
End: 2023-06-27

## 2023-06-27 NOTE — REVIEW OF SYSTEMS
[Feeling Tired] : feeling tired [Palpitations] : palpitations [Depression] : depression [Negative] : Neurological [de-identified] : Sure of TIA/stroke with brain aneurysm clipping

## 2023-06-27 NOTE — ASSESSMENT
[FreeTextEntry1] : The patient is a 62-year-old G1, P1 postmenopausal white female of Ashkenazi Mandaeism descent.  She underwent menarche at age 13 and had her first child at age 34.  She underwent menopause when she had a radical TABBY/BSO in  at the time of her  when she had a significant stage III cervical cancer.  She was on Premarin for 5 years after the TABBY/BSO and then took tamoxifen for 5 years after that.  She has a strong family history of breast and ovarian cancer with her mother who  of ovarian cancer at age 45 and her maternal aunt who  from metastatic breast cancer at age 50.  The patient herself was diagnosed with a left breast upper outer quadrant breast cancer in  and underwent a left breast partial mastectomy and sentinel lymph node biopsy by Dr. Ramos at [facility] on 2013 for 1.6 cm moderately differentiated invasive duct cancer with 2 negative sentinel lymph nodes which was ER/SD strongly positive and HER2 positive by FISH with a Ki-67 of 25%.  She had a FISH ratio of 2.3 and a total HER2 count of 7.4.  The patient underwent chemotherapy at [facility] through Dr. Cotto and had Taxol with a full year of Herceptin but she refused any radiation therapy or endocrine therapy.  She then had an abnormal MRI on May 18, 2016 with a suspicious mass in the wide excision site in the left breast 1:00 region which was also seen on ultrasound is an 8 mm density and ultrasound core biopsy on May 18, 2016 showed a recurrent moderately differentiated invasive duct cancer which was again ER/SD positive and HER2 equivocal by IHC but turned out to be negative by FISH with a ratio of 1.3 and total HER2 count of 3.5.  She then tested BRCA2 positive with a 6174 deletion T mutation.  PET scan at that time showed no distant disease.  A mammography showed some other calcifications in the left breast but the patient decided to undergo bilateral mastectomies with bilateral nipple sparing technique with a prophylactic right breast mastectomy and direct to implant reconstruction with a repeat left axillary sentinel lymph node biopsy which was performed on 2016.  Final pathology showed 2 negative sentinel lymph nodes and the right breast was negative.  The left breast did show some foci of invasive duct cancer measuring 1 cm, 3 mm, and 1 cm respectively.  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  Some of the cancer was found in detached fragments which was on the anterior margin underneath the previous wide excision site.  I removed the further anterior margin which was completely negative so the final margins were all negative.  Final pathology showed the cancer to be ER/SD positive HER2 equivocal by IHC with a Ki-67 of 25% with a FISH ratio of 1.3 and total HER2 count of 3.5.  The patient saw Dr. Carrera at [facility] and an Oncotype Dx recurrence score was 22.  She underwent CMF chemotherapy which finished on 2016.  She later had her implants exchanged for cosmetic reasons with textured implants by Dr. Durán.  She was placed on toremifene. She now comes in and underwent a right upper lobe lung resection for lung cancer by Dr. Zacarias Bunch performed in .  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  In 2021, she was admitted with a TIA and vertigo and found to have a brain aneurysm.  She also had a short bout of A. fib and has been on Eliquis.  She had a brain aneurysm clipping at [facility] in 2022.  She was seen by Dr. Watson at [facility] and has remained on toremifene since  and came previously in  to discuss her endocrine therapy due to her history of TIA/stroke.  Dr. Watson sent out a breast index score which was high at 6.7.  He felt she needed to stay on endocrine therapy and wanted her to come off of the toremifene given the stroke history.  I would be in general agreement with this but the patient has absolutely been refusing an AI.  The other option would be for her just to come off of the toremifene which she has been on for many years.  The patient is very concerned about vaginal atrophy and she could possibly use an AI with some vaginal estrogen creams.  She lost confidence in Dr. Watson and has been following up with Dr. Gregory and Dr. Pascual.  She refused to stop the toremifene despite the risk of TIA/stroke and she has been kept on Eliquis while on the medication. She unfortunately developed a recurrence along her right lung resection staple line which was diagnosed on an abnormal PET scan from May 2, 2023 with also abnormal finding seen on CT scan of the chest performed on 2023.  Dr. Bunch feels this was unresectable and she underwent SBRT through Dr. Guzman for the recurrent lung cancer.  She was also been found to have some left axillary adenopathy on the CT scan which was also hypermetabolic on PET scan. She does have textured implants and understands the increased risks of ALCL but does not want to undergo further surgery.  Directed ultrasound and then ultrasound-guided core biopsy was performed on May 12, 2023 of this left lower axillary finding and this showed recurrent moderately differentiated invasive duct cancer with minimal lymphoid tissue and involving skeletal muscle and fibroadipose tissue which was ER positive between 91 and 100% and SD strongly positive between 81 and 90% and this was HER2 negative by FISH with a Ki-67 between 20 and 30%.  She was seen by radiation oncology for this axillary recurrence as well as medical oncology with Dr. Watson at [facility] but refused any upfront chemotherapy and was refusing chest wall radiation.  She did allow excision of this chest wall lesion with a lower axillary lymph node dissection which was performed on 2023. The final pathology showed this chest wall lesion to be be a completely replaced axillary lymph node and the node dissection showed a total of 7 out of 17 positive lymph nodes with 5 at the 7 involved nodes with extranodal extension and there was lymphovascular invasion present in the surrounding adipose tissue.  This remained ER/SD strongly positive and HER2 negative with a high Ki-67 at 25%.  This was a recurrent pathologic prognostic stage IIB breast cancer but pathologic anatomic stage IIIA.  On exam today, ....... I went over the pathology with the patient and gave her a copy the report for her records.  She will be following up with Dr. Gregory postoperatively for medical oncology evaluation and I did talk to her about the need for adjuvant chemotherapy and radiation in this case.  She is going to go back to see Dr. Guzman up at [facility] for radiation oncology evaluation.  Otherwise, I would like to see her again in ???????? for another postop wound evaluation.

## 2023-06-27 NOTE — HISTORY OF PRESENT ILLNESS
[FreeTextEntry1] : The patient is a 62-year-old G1, P1 postmenopausal white female of Ashkenazi [place] descent.  She underwent menarche at age 13 and had her first child at age 34.  She underwent menopause when she had a radical TABBY/BSO in  at the time of her  when she had a significant stage III cervical cancer.  She was on Premarin for 5 years after the TABBY/BSO and then took tamoxifen for 5 years after that.  She has a strong family history of breast and ovarian cancer with her mother who  of ovarian cancer at age 45 and her maternal aunt who  from metastatic breast cancer at age 50.  The patient herself was diagnosed with a left breast upper outer quadrant breast cancer in  and underwent a left breast partial mastectomy and sentinel lymph node biopsy by Dr. Ramos at [facility] on 2013 for 1.6 cm moderately differentiated invasive duct cancer with 2 negative sentinel lymph nodes which was ER/WY strongly positive and HER2 positive by FISH with a Ki-67 of 25%.  She had a FISH ratio of 2.3 and a total HER2 count of 7.4.  The patient underwent chemotherapy at [facility] through Dr. Cotto and had Taxol with a full year of Herceptin but she refused any radiation therapy or endocrine therapy.  She then had an abnormal MRI on May 18, 2016 with a suspicious mass in the wide excision site in the left breast 1:00 region which was also seen on ultrasound is an 8 mm density and ultrasound core biopsy on May 18, 2016 showed a recurrent moderately differentiated invasive duct cancer which was again ER/WY positive and HER2 equivocal by IHC but turned out to be negative by FISH with a ratio of 1.3 and total HER2 count of 3.5.  She then tested BRCA2 positive with a 6174 deletion T mutation.  PET scan at that time showed no distant disease.  A mammography showed some other calcifications in the left breast but the patient decided to undergo bilateral mastectomies with bilateral nipple sparing technique with a prophylactic right breast mastectomy and direct to implant reconstruction with a repeat left axillary sentinel lymph node biopsy which was performed on 2016.  Final pathology showed 2 negative sentinel lymph nodes in the right breast was negative.  The left breast did show some foci of invasive duct cancer measuring 1 cm, 3 mm, and 1 cm respectively.  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  Some of the cancer was found in detached fragments which was on the anterior margin underneath the previous wide excision site.  I removed the further anterior margin which was completely negative so the final margins were all negative.  Final pathology showed the cancer to be ER/WY positive HER2 equivocal by IHC with a Ki-67 of 25% with a FISH ratio of 1.3 and total HER2 count of 3.5.  The patient saw Dr. Carrera at [facility] and an Oncotype Dx recurrence score was 22.  She underwent CMF chemotherapy which finished on 2016.  She later had her implants exchanged for cosmetic reasons with textured implants by Dr. Durán.  She was placed on toremifene.  She then underwent a right upper lobe lung resection for lung cancer by Dr. Zacarias Bunch performed in .  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  In 2021, she was admitted with a TIA and vertigo and found to have a brain aneurysm.  She also had a short bout of A. fib and has been on Eliquis.  She had a brain aneurysm clipping at [facility] in 2022.  She had been seeing Dr. Watson at [facility] and has remained on toremifene since  and has now been following up with Dr. Gregory and Dr. Pascual and was advised to come off of the toremifene because of history of a TIA/stroke but the patient will only agree to a toremifene and has been kept on Eliquis while on the medication.  She has developed a recurrence along the staple line of her right lung cancer found on recent PET scan performed in May 2023 and CT scan performed in 2023.  She also been found to have some left axillary adenopathy which was hypermetabolic on PET scan and directed ultrasound and then ultrasound-guided core biopsy was performed on May 12, 2023 of this left lower axillary finding and this showed recurrent moderately differentiated invasive duct cancer with minimal lymphoid tissue and involving skeletal muscle and fibroadipose tissue which was ER positive between 91 and 100% and WY strongly positive between 81 and 90% and this was HER2 negative by FISH with a Ki-67 between 20 and 30%.  She underwent SBRT radiation for the lung recurrence and was seen by radiation oncology as well as medical oncology and was refusing any neoadjuvant chemotherapy or chest wall radiation.  Eventually, however the patient would except local excision of this chest wall density with a lower axilla lymph node dissection which was performed on 2023.  The final pathology showed this chest wall lesion to be a completely replaced axillary lymph node and the node dissection showed a total of 7 out of 17 positive lymph nodes with 5 at the 7 involved nodes with extranodal extension and there was lymphovascular invasion present in the surrounding adipose tissue.  This remained ER/WY strongly positive and HER2 negative with a high Ki-67 at 25%.  This was a recurrent pathologic prognostic stage IIB breast cancer but pathologic anatomic stage IIIA.  She was advised to follow-up with Dr. Gregory for medical oncology evaluation postoperatively and comes in now for postop follow-up.

## 2023-06-27 NOTE — REASON FOR VISIT
[Post Op: _________] : a [unfilled] post op visit [FreeTextEntry1] : The patient is of [place]ken[place] descent with a family history of her mother who  of ovarian cancer at age 45 and maternal aunt who  of metastatic breast cancer at age 50.  The patient herself was diagnosed with a left breast upper outer quadrant breast cancer in  and underwent a partial mastectomy and sentinel lymph node biopsy in 2013 by Dr. Jo in [facility] for 1.6 cm moderately differentiated ductal cancer with 2 negative nodes which was triple positive for which she underwent chemotherapy with Taxol and a year of Herceptin at [facility] with Dr. Cotto.  She refused radiation therapy and endocrine therapy and eventually developed a recurrence in the left breast upper outer quadrant diagnosed after ultrasound-guided core biopsy in May 2016 and this cancer was ER/SD positive and HER2/valentino equivocal by IHC but negative by FISH.  She tested BRCA2 positive and underwent bilateral nipple sparing mastectomies with a prophylactic right breast mastectomy and repeat left axillary sentinel lymph node biopsy with direct to implant reconstruction on 2016.  Final pathology showed some residual foci of invasive duct cancer with the largest measuring 1 cm and 2 negative sentinel lymph nodes and Oncotype recurrence score was 22 and she was treated with CMF chemotherapy by Dr. Carrera at [facility].  She had her implants exchanged for cosmetic reasons by Dr. Durán.  She underwent a right upper lobe lung resection by Dr. Zacarias Bunch in  for lung cancer. She now comes in for interval follow-up after she has been found to have a recurrence of her lung cancer along the staple line in the right lung and an enlarged left axillary lymph node seen on a PET scan from May 2023 and CAT scan performed in 2023.  Ultrasound was performed and ultrasound-guided core biopsy was performed on May 12, 2023 showing recurrent moderately differentiated invasive ductal breast cancer involving skeletal muscle and adipose tissue which was ER/SD positive and HER2 negative with a high Ki-67.  She underwent SBRT radiation for the lung cancer and was seen by radiation oncology and medical oncology and she was refusing any upfront neoadjuvant chemotherapy as well as chest wall radiation but would except the chest wall excision and lower axillary lymph node sampling which was performed on 2023.  Final pathology showed a total of 7 out of 17 positive lower left axillary lymph nodes and there was extranodal extension found in 5 of the positive nodes with lymphovascular invasion present in the accompanying fatty axillary tissue.  She was sent back to medical oncology for an evaluation and comes in now for postop follow-up.

## 2023-06-27 NOTE — PAST MEDICAL HISTORY
[Postmenopausal] : The patient is postmenopausal [Menarche Age ____] : age at menarche was [unfilled] [Menopause Age____] : age at menopause was [unfilled] [Total Preg ___] : G[unfilled] [Live Births ___] : P[unfilled]  [Age At Live Birth ___] : Age at live birth: [unfilled] [History of Hormone Replacement Treatment] : has no history of hormone replacement treatment [de-identified] : s/p TABBY/BSO for cervical cancer [id number]

## 2023-06-27 NOTE — PHYSICAL EXAM
[Normocephalic] : normocephalic [Atraumatic] : atraumatic [EOMI] : extra ocular movement intact [Supple] : supple [No Supraclavicular Adenopathy] : no supraclavicular adenopathy [No Cervical Adenopathy] : no cervical adenopathy [Examined in the supine and seated position] : examined in the supine and seated position [No dominant masses] : no dominant masses in right breast  [No dominant masses] : no dominant masses left breast [No Nipple Retraction] : no left nipple retraction [No Nipple Discharge] : no left nipple discharge [Breast Mass Right Breast ___cm] : no masses [Breast Mass Left Breast ___cm] : no masses [No Axillary Lymphadenopathy] : no right axillary lymphadenopathy [No Edema] : no edema [No Rashes] : no rashes [No Ulceration] : no ulceration [Breast Nipple Inversion] : nipples not inverted [Breast Nipple Retraction] : nipples not retracted [Breast Nipple Flattening] : nipples not flattened [Breast Nipple Fissures] : nipples not fissured [Breast Abnormal Lactation (Galactorrhea)] : no galactorrhea [Breast Abnormal Secretion Bloody Fluid] : no bloody discharge [Breast Abnormal Secretion Serous Fluid] : no serous discharge [Breast Abnormal Secretion Opalescent Fluid] : no milky discharge [de-identified] : On exam, the patient has obvious bilateral nipple sparing mastectomies and direct implant reconstructions with this history of a recurrent left breast cancer in a BRCA2 positive patient.  I cannot feel any suspicious findings over either implant.  She is healing well from her left axillary lymph node dissection.  Her drain is ??????????.  She has no sensation in either nipple areolar complex or in her inframammary folds.  She has good sensation on the superior, medial, and lateral poles. [de-identified] : Status post prophylactic nipple sparing mastectomy with direct to implant reconstruction with no suspicious findings. [de-identified] : Status post nipple sparing mastectomy with direct to implant reconstruction due to breast cancer recurrence in this BRCA2 positive patient with no evidence of recurrence.  Status post recent left lower axilla lymph node dissection.  Wound is healing well.  Her drain is putting out ??????????. [de-identified] : Status post lower left axillary lymph node dissection.

## 2023-06-30 ENCOUNTER — APPOINTMENT (OUTPATIENT)
Dept: BREAST CENTER | Facility: CLINIC | Age: 63
End: 2023-06-30
Payer: COMMERCIAL

## 2023-06-30 VITALS
DIASTOLIC BLOOD PRESSURE: 64 MMHG | HEART RATE: 76 BPM | OXYGEN SATURATION: 98 % | BODY MASS INDEX: 25.06 KG/M2 | WEIGHT: 146 LBS | SYSTOLIC BLOOD PRESSURE: 110 MMHG

## 2023-06-30 PROCEDURE — 99024 POSTOP FOLLOW-UP VISIT: CPT

## 2023-06-30 NOTE — HISTORY OF PRESENT ILLNESS
[FreeTextEntry1] : The patient is a 62-year-old G1, P1 postmenopausal white female of Ashkenazi [place] descent.  She underwent menarche at age 13 and had her first child at age 34.  She underwent menopause when she had a radical TABBY/BSO in  at the time of her  when she had a significant stage III cervical cancer.  She was on Premarin for 5 years after the TABBY/BSO and then took tamoxifen for 5 years after that.  She has a strong family history of breast and ovarian cancer with her mother who  of ovarian cancer at age 45 and her maternal aunt who  from metastatic breast cancer at age 50.  The patient herself was diagnosed with a left breast upper outer quadrant breast cancer in  and underwent a left breast partial mastectomy and sentinel lymph node biopsy by Dr. Ramos at [facility] on 2013 for 1.6 cm moderately differentiated invasive duct cancer with 2 negative sentinel lymph nodes which was ER/NC strongly positive and HER2 positive by FISH with a Ki-67 of 25%.  She had a FISH ratio of 2.3 and a total HER2 count of 7.4.  The patient underwent chemotherapy at [facility] through Dr. Cotto and had Taxol with a full year of Herceptin but she refused any radiation therapy or endocrine therapy.  She then had an abnormal MRI on May 18, 2016 with a suspicious mass in the wide excision site in the left breast 1:00 region which was also seen on ultrasound is an 8 mm density and ultrasound core biopsy on May 18, 2016 showed a recurrent moderately differentiated invasive duct cancer which was again ER/NC positive and HER2 equivocal by IHC but turned out to be negative by FISH with a ratio of 1.3 and total HER2 count of 3.5.  She then tested BRCA2 positive with a 6174 deletion T mutation.  PET scan at that time showed no distant disease.  A mammography showed some other calcifications in the left breast but the patient decided to undergo bilateral mastectomies with bilateral nipple sparing technique with a prophylactic right breast mastectomy and direct to implant reconstruction with a repeat left axillary sentinel lymph node biopsy which was performed on 2016.  Final pathology showed 2 negative sentinel lymph nodes in the right breast was negative.  The left breast did show some foci of invasive duct cancer measuring 1 cm, 3 mm, and 1 cm respectively.  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  Some of the cancer was found in detached fragments which was on the anterior margin underneath the previous wide excision site.  I removed the further anterior margin which was completely negative so the final margins were all negative.  Final pathology showed the cancer to be ER/NC positive HER2 equivocal by IHC with a Ki-67 of 25% with a FISH ratio of 1.3 and total HER2 count of 3.5.  The patient saw Dr. Carrera at [facility] and an Oncotype Dx recurrence score was 22.  She underwent CMF chemotherapy which finished on 2016.  She later had her implants exchanged for cosmetic reasons with textured implants by Dr. Durán.  She was placed on toremifene.  She then underwent a right upper lobe lung resection for lung cancer by Dr. Zacarias Bunch performed in .  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  In 2021, she was admitted with a TIA and vertigo and found to have a brain aneurysm.  She also had a short bout of A. fib and has been on Eliquis.  She had a brain aneurysm clipping at [facility] in 2022.  She had been seeing Dr. Watson at [facility] and has remained on toremifene since  and has now been following up with Dr. Gregory and Dr. Pascual and was advised to come off of the toremifene because of history of a TIA/stroke but the patient will only agree to a toremifene and has been kept on Eliquis while on the medication.  She has developed a recurrence along the staple line of her right lung cancer found on recent PET scan performed in May 2023 and CT scan performed in 2023.  She also been found to have some left axillary adenopathy which was hypermetabolic on PET scan and directed ultrasound and then ultrasound-guided core biopsy was performed on May 12, 2023 of this left lower axillary finding and this showed recurrent moderately differentiated invasive duct cancer with minimal lymphoid tissue and involving skeletal muscle and fibroadipose tissue which was ER positive between 91 and 100% and NC strongly positive between 81 and 90% and this was HER2 negative by FISH with a Ki-67 between 20 and 30%.  She underwent SBRT radiation for the lung recurrence and was seen by radiation oncology as well as medical oncology and was refusing any neoadjuvant chemotherapy or chest wall radiation.  Eventually, however the patient would except local excision of this chest wall density with a lower axilla lymph node dissection which was performed on 2023.  The final pathology showed this chest wall lesion to be a completely replaced axillary lymph node and the node dissection showed a total of 7 out of 17 positive lymph nodes with 5 at the 7 involved nodes with extranodal extension and there was lymphovascular invasion present in the surrounding adipose tissue.  This remained ER/NC strongly positive and HER2 negative with a high Ki-67 at 25%.  This was a recurrent pathologic prognostic stage IIB breast cancer but pathologic anatomic stage IIIA.  She was advised to follow-up with Dr. Gregory for medical oncology evaluation postoperatively and comes in now for postop follow-up.

## 2023-06-30 NOTE — PAST MEDICAL HISTORY
[Postmenopausal] : The patient is postmenopausal [Menarche Age ____] : age at menarche was [unfilled] [Menopause Age____] : age at menopause was [unfilled] [History of Hormone Replacement Treatment] : has no history of hormone replacement treatment [Total Preg ___] : G[unfilled] [Live Births ___] : P[unfilled]  [Age At Live Birth ___] : Age at live birth: [unfilled] [de-identified] : s/p TABBY/BSO for cervical cancer [id number]

## 2023-06-30 NOTE — REVIEW OF SYSTEMS
[Negative] : Heme/Lymph [Feeling Tired] : feeling tired [Palpitations] : palpitations [Depression] : depression [de-identified] : Sure of TIA/stroke with brain aneurysm clipping

## 2023-06-30 NOTE — REASON FOR VISIT
[Post Op: _________] : a [unfilled] post op visit [FreeTextEntry1] : The patient is of [place]ken[place] descent with a family history of her mother who  of ovarian cancer at age 45 and maternal aunt who  of metastatic breast cancer at age 50.  The patient herself was diagnosed with a left breast upper outer quadrant breast cancer in  and underwent a partial mastectomy and sentinel lymph node biopsy in 2013 by Dr. Jo in [facility] for 1.6 cm moderately differentiated ductal cancer with 2 negative nodes which was triple positive for which she underwent chemotherapy with Taxol and a year of Herceptin at [facility] with Dr. Cotto.  She refused radiation therapy and endocrine therapy and eventually developed a recurrence in the left breast upper outer quadrant diagnosed after ultrasound-guided core biopsy in May 2016 and this cancer was ER/LA positive and HER2/valentino equivocal by IHC but negative by FISH.  She tested BRCA2 positive and underwent bilateral nipple sparing mastectomies with a prophylactic right breast mastectomy and repeat left axillary sentinel lymph node biopsy with direct to implant reconstruction on 2016.  Final pathology showed some residual foci of invasive duct cancer with the largest measuring 1 cm and 2 negative sentinel lymph nodes and Oncotype recurrence score was 22 and she was treated with CMF chemotherapy by Dr. Carrera at [facility].  She had her implants exchanged for cosmetic reasons by Dr. Durán.  She underwent a right upper lobe lung resection by Dr. Zacarias Bunch in  for lung cancer. She now comes in for interval follow-up after she has been found to have a recurrence of her lung cancer along the staple line in the right lung and an enlarged left axillary lymph node seen on a PET scan from May 2023 and CAT scan performed in 2023.  Ultrasound was performed and ultrasound-guided core biopsy was performed on May 12, 2023 showing recurrent moderately differentiated invasive ductal breast cancer involving skeletal muscle and adipose tissue which was ER/LA positive and HER2 negative with a high Ki-67.  She underwent SBRT radiation for the lung cancer and was seen by radiation oncology and medical oncology and she was refusing any upfront neoadjuvant chemotherapy as well as chest wall radiation but would except the chest wall excision and lower axillary lymph node sampling which was performed on 2023.  Final pathology showed a total of 7 out of 17 positive lower left axillary lymph nodes and there was extranodal extension found in 5 of the positive nodes with lymphovascular invasion present in the accompanying fatty axillary tissue.  She was sent back to medical oncology for an evaluation and comes in now for postop follow-up.

## 2023-06-30 NOTE — ASSESSMENT
[FreeTextEntry1] : The patient is a 62-year-old G1, P1 postmenopausal white female of Ashkenazi Orthod[employer] descent.  She underwent menarche at age 13 and had her first child at age 34.  She underwent menopause when she had a radical TABBY/BSO in  at the time of her  when she had a significant stage III cervical cancer.  She was on Premarin for 5 years after the TABBY/BSO and then took tamoxifen for 5 years after that.  She has a strong family history of breast and ovarian cancer with her mother who  of ovarian cancer at age 45 and her maternal aunt who  from metastatic breast cancer at age 50.  The patient herself was diagnosed with a left breast upper outer quadrant breast cancer in  and underwent a left breast partial mastectomy and sentinel lymph node biopsy by Dr. Ramos at [facility] [place] on 2013 for 1.6 cm moderately differentiated invasive duct cancer with 2 negative sentinel lymph nodes which was ER/NY strongly positive and HER2 positive by FISH with a Ki-67 of 25%.  She had a FISH ratio of 2.3 and a total HER2 count of 7.4.  The patient underwent chemotherapy at [facility] through Dr. Cotto and had Taxol with a full year of Herceptin but she refused any radiation therapy or endocrine therapy.  She then had an abnormal MRI on May 18, 2016 with a suspicious mass in the wide excision site in the left breast 1:00 region which was also seen on ultrasound is an 8 mm density and ultrasound core biopsy on May 18, 2016 showed a recurrent moderately differentiated invasive duct cancer which was again ER/NY positive and HER2 equivocal by IHC but turned out to be negative by FISH with a ratio of 1.3 and total HER2 count of 3.5.  She then tested BRCA2 positive with a 6174 deletion T mutation.  PET scan at that time showed no distant disease.  A mammography showed some other calcifications in the left breast but the patient decided to undergo bilateral mastectomies with bilateral nipple sparing technique with a prophylactic right breast mastectomy and direct to implant reconstruction with a repeat left axillary sentinel lymph node biopsy which was performed on 2016.  Final pathology showed 2 negative sentinel lymph nodes and the right breast was negative.  The left breast did show some foci of invasive duct cancer measuring 1 cm, 3 mm, and 1 cm respectively.  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  Some of the cancer was found in detached fragments which was on the anterior margin underneath the previous wide excision site.  I removed the further anterior margin which was completely negative so the final margins were all negative.  Final pathology showed the cancer to be ER/NY positive HER2 equivocal by IHC with a Ki-67 of 25% with a FISH ratio of 1.3 and total HER2 count of 3.5.  The patient saw Dr. Carrera at [facility] and an Oncotype Dx recurrence score was 22.  She underwent CMF chemotherapy which finished on 2016.  She later had her implants exchanged for cosmetic reasons with textured implants by Dr. Durán.  She was placed on toremifene. She now comes in and underwent a right upper lobe lung resection for lung cancer by Dr. Zacarias Bunch performed in .  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  In 2021, she was admitted with a TIA and vertigo and found to have a brain aneurysm.  She also had a short bout of A. fib and has been on Eliquis.  She had a brain aneurysm clipping at [facility] in 2022.  She was seen by Dr. Watson at [facility] and has remained on toremifene since  and came previously in  to discuss her endocrine therapy due to her history of TIA/stroke.  Dr. Watson sent out a breast index score which was high at 6.7.  He felt she needed to stay on endocrine therapy and wanted her to come off of the toremifene given the stroke history.  I would be in general agreement with this but the patient has absolutely been refusing an AI.  The other option would be for her just to come off of the toremifene which she has been on for many years.  The patient is very concerned about vaginal atrophy and she could possibly use an AI with some vaginal estrogen creams.  She lost confidence in Dr. Watson and has been following up with Dr. Gregory and Dr. Pascual.  She refused to stop the toremifene despite the risk of TIA/stroke and she has been kept on Eliquis while on the medication. She unfortunately developed a recurrence along her right lung resection staple line which was diagnosed on an abnormal PET scan from May 2, 2023 with also abnormal finding seen on CT scan of the chest performed on 2023.  Dr. Bunch feels this was unresectable and she underwent SBRT through Dr. Guzman for the recurrent lung cancer.  She was also been found to have some left axillary adenopathy on the CT scan which was also hypermetabolic on PET scan. She does have textured implants and understands the increased risks of ALCL but does not want to undergo further surgery.  Directed ultrasound and then ultrasound-guided core biopsy was performed on May 12, 2023 of this left lower axillary finding and this showed recurrent moderately differentiated invasive duct cancer with minimal lymphoid tissue and involving skeletal muscle and fibroadipose tissue which was ER positive between 91 and 100% and NY strongly positive between 81 and 90% and this was HER2 negative by FISH with a Ki-67 between 20 and 30%.  She was seen by radiation oncology for this axillary recurrence as well as medical oncology with Dr. Watson at [facility] but refused any upfront chemotherapy and was refusing chest wall radiation.  She did allow excision of this chest wall lesion with a lower axillary lymph node dissection which was performed on 2023. The final pathology showed this chest wall lesion to be be a completely replaced axillary lymph node and the node dissection showed a total of 7 out of 17 positive lymph nodes with 5 at the 7 involved nodes with extranodal extension and there was lymphovascular invasion present in the surrounding adipose tissue.  This remained ER/NY strongly positive and HER2 negative with a high Ki-67 at 25%.  This was a recurrent pathologic prognostic stage IIB breast cancer but pathologic anatomic stage IIIA.  On exam today, she is healing well from her lower left axillary node dissection and her drain is putting out about 20 cc a day. I would like to leave it in for now. I went over the pathology with the patient and gave her a copy the report for her records.  She will be following up with Dr. Gregory postoperatively for medical oncology evaluation and I did talk to her about the need for adjuvant chemotherapy and radiation in this case.  She is going to go back to see Dr. Guzman up at [facility] for radiation oncology evaluation.  Otherwise, I would like to see her again on Monday for another postop wound evaluation and likely drain removal at that time.

## 2023-06-30 NOTE — PHYSICAL EXAM
[Normocephalic] : normocephalic [Atraumatic] : atraumatic [EOMI] : extra ocular movement intact [Supple] : supple [No Supraclavicular Adenopathy] : no supraclavicular adenopathy [No Cervical Adenopathy] : no cervical adenopathy [Examined in the supine and seated position] : examined in the supine and seated position [No dominant masses] : no dominant masses in right breast  [No dominant masses] : no dominant masses left breast [No Nipple Retraction] : no left nipple retraction [No Nipple Discharge] : no left nipple discharge [Breast Mass Right Breast ___cm] : no masses [Breast Mass Left Breast ___cm] : no masses [Breast Nipple Inversion] : nipples not inverted [Breast Nipple Retraction] : nipples not retracted [Breast Nipple Flattening] : nipples not flattened [Breast Nipple Fissures] : nipples not fissured [Breast Abnormal Lactation (Galactorrhea)] : no galactorrhea [Breast Abnormal Secretion Bloody Fluid] : no bloody discharge [Breast Abnormal Secretion Serous Fluid] : no serous discharge [Breast Abnormal Secretion Opalescent Fluid] : no milky discharge [No Axillary Lymphadenopathy] : no right axillary lymphadenopathy [No Edema] : no edema [No Rashes] : no rashes [No Ulceration] : no ulceration [de-identified] : On exam, the patient has obvious bilateral nipple sparing mastectomies and direct implant reconstructions with this history of a recurrent left breast cancer in a BRCA2 positive patient.  I cannot feel any suspicious findings over either implant.  She is healing well from her left axillary lymph node dissection.  Her drain is putting out 20 cc a day and I would like to leave it in for now.  She has no sensation in either nipple areolar complex or in her inframammary folds.  She has good sensation on the superior, medial, and lateral poles. [de-identified] : Status post prophylactic nipple sparing mastectomy with direct to implant reconstruction with no suspicious findings. [de-identified] : Status post nipple sparing mastectomy with direct to implant reconstruction due to breast cancer recurrence in this BRCA2 positive patient with no evidence of recurrence.  Status post recent left lower axilla lymph node dissection.  Wound is healing well.  Her drain is putting out 20 cc a day and I would like to leave it in for now. [de-identified] : Status post lower left axillary lymph node dissection.

## 2023-07-03 ENCOUNTER — NON-APPOINTMENT (OUTPATIENT)
Age: 63
End: 2023-07-03

## 2023-07-03 ENCOUNTER — APPOINTMENT (OUTPATIENT)
Dept: BREAST CENTER | Facility: CLINIC | Age: 63
End: 2023-07-03

## 2023-07-05 ENCOUNTER — NON-APPOINTMENT (OUTPATIENT)
Age: 63
End: 2023-07-05

## 2023-07-06 ENCOUNTER — RESULT REVIEW (OUTPATIENT)
Age: 63
End: 2023-07-06

## 2023-07-06 ENCOUNTER — NON-APPOINTMENT (OUTPATIENT)
Age: 63
End: 2023-07-06

## 2023-07-06 ENCOUNTER — APPOINTMENT (OUTPATIENT)
Dept: HEMATOLOGY ONCOLOGY | Facility: CLINIC | Age: 63
End: 2023-07-06
Payer: COMMERCIAL

## 2023-07-06 VITALS
TEMPERATURE: 85.9 F | DIASTOLIC BLOOD PRESSURE: 49 MMHG | RESPIRATION RATE: 16 BRPM | BODY MASS INDEX: 24.59 KG/M2 | WEIGHT: 144 LBS | HEART RATE: 70 BPM | HEIGHT: 64 IN | OXYGEN SATURATION: 98 % | SYSTOLIC BLOOD PRESSURE: 88 MMHG

## 2023-07-06 PROCEDURE — 36415 COLL VENOUS BLD VENIPUNCTURE: CPT

## 2023-07-06 PROCEDURE — 99215 OFFICE O/P EST HI 40 MIN: CPT | Mod: 25

## 2023-07-07 NOTE — CONSULT LETTER
[Dear  ___] : Dear  [unfilled], [Consult Letter:] : I had the pleasure of evaluating your patient, [unfilled]. [Please see my note below.] : Please see my note below. [Consult Closing:] : Thank you very much for allowing me to participate in the care of this patient.  If you have any questions, please do not hesitate to contact me. [Sincerely,] : Sincerely, [FreeTextEntry3] : Heidi Gregory DO, FACJOSELITO, FACP\par Medical Oncology and Hematology\par [facility]\par

## 2023-07-07 NOTE — HISTORY OF PRESENT ILLNESS
[de-identified] : Ms. Roman is a 61 year old woman who presents for endocrine therapy consultation. Patient is on toremifene because she takes wellbutrin which is contraindicated with tamoxifen. Patient is seeing us today because her current oncologist does not want her on toremifene and wants to place her on an AI but patient is refusing. Patient states that last week she experienced her first migraine since her brain surgery and states that she left a note on her night stand for her daughter because the patient did not believe she would wake up. Patient states that she has PTSD due to her medical history. Patients goal is to be treated by Dr. Greogry Because she is passionate about her quality of life over her quantity. She wishes to be viewed as a whole person and is advocating to be placed back on toremifene. \par \par DEXA shows osteopenia- patient denied Fosamax \par \par Patient had a tumor removed from her chest cavity at 15. Cervical Ca dx 10 weeks into her 1st pregnancy at 34 years old. \par \par 2013- left breast upper outer IDC; s/p partial mastectomy + sentinel lymph node biopsy Dr. Jo ([place])\par 1.6cm moderately differentiated IDC with 2 negative nodes, triple positive, s/p taxol + herceptin x12 months Dr. Cotto ([facility] [place]par She refused XRT and endocrine therapy and eventually developed recurrence in the left breast upper outer quadrant\par \par 2016 ER/PA positive, HER 2 equivical by IHC, negative by FISH; BRCA positive with 6174 deletion T mutation\par s/p bilateral nipple sparing mastectomies, repeat left sentinel node biopsy, with direct to implant reconstruction\par Path: residual foci IDC, largest measuring 1cm and 2 negative nodes, oncotype 22\par s/p CMF Dr. Carrera ([place]) 12/2016,  patient not willing to take AI or tamoxifen and placed on toremifene \par s/p implant exchange Dr. Durán \par \par 2020 s/p RUL resection Dr. Zacarias Frye\par \par 10/2021 admitted for TIA and vertigo given TPA but found to have brain aneurysm; placed on eliquis\par \par 2/2022 brain aneurysm clipping at [facility] followed by Dr. 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 \par Ultrasound was performed and ultrasound-guided core biopsy was performed on May 12, 2023 showing recurrent moderately differentiated invasive ductal breast cancer involving skeletal muscle and adipose tissue which was ER/OH positive and HER2 negative with a high Ki-67. \par She underwent SBRT radiation for the lung cancer \par Initially refused neoadjuvant chemotherapy as well as chest wall radiation\par Final pathology showed a total of 7 out of 17 positive lower left axillary lymph nodes and there was extranodal extension found in 5 of the positive nodes with lymphovascular invasion present in the accompanying fatty axillary tissue. \par \par Very frustrated about her current situation and scared about side effects related to chemo and other endocrine therapy

## 2023-07-07 NOTE — ASSESSMENT
[FreeTextEntry1] : #Breast cancer\par 2013- left breast upper outer IDC; s/p partial mastectomy + sentinel lymph node biopsy Dr. Jo ([place])\par 1.6cm moderately differentiated IDC with 2 negative nodes, triple positive, s/p taxol + herceptin [place]12 months [place] [place]par She refused XRT and endocrine therapy and eventually developed recurrence in the left breast upper outer quadrant\par 2016 ER/UT positive, HER 2 equivocal by IHC, negative by FISH; BRCA positive with 6174 deletion T mutation\par s/p bilateral nipple sparing mastectomies, repeat left sentinel node biopsy, with direct to implant reconstruction\par Path: residual foci IDC, largest measuring 1cm and 2 negative nodes, oncotype 22\par s/p CMF Dr. Carrera ([place]) 12/2016,  patient not willing to take AI or tamoxifen and placed on toremifene \par s/p implant exchange Dr. Durán \par Patient refuses AI or Tamoxifen. She has been on Tormefine in the past. We have discussed that this is typically not the medication we use in the adjuvant setting and that this is used in the metastatic setting. We have also discussed that this increases her risk of VTE and TIA/stroke. She understands her risk and would still like to proceed. I have advised that she remain on Eliquis while she is on this medication. I do believe that given her history of ER+ breast cancer that she would benefit from Endocrine therapy and she completely understands the risks but wants to proceed with Tormefine. \par 7/6/23 - vs reviewed. labs drawn in office today. Reviewed PET report and final pathology showed a total of 7 out of 17 positive lower left axillary lymph nodes and there was extranodal extension found in 5 of the positive nodes with lymphovascular invasion present in the accompanying fatty axillary tissue. We discussed the recommendation of chemotherapy with ddAC -->T vs TC. She is concerned about permanent alopecia associated with Taxotere. Reviewed side effect profile of dd AC --> T. Adriamycin 60 mg/m2 and cytoxan 600 mg/m2 q 2 weeks for 4 cycles with onpro for bone marrow convalescence. This will be followed by Taxol 80 mg/m2 weekly x 12 doses. We have reviewed the side effects to include but are not limited to cytopenias with increased risk of infection, sepsis and even death, N/V, Fatigue, alopecia, <1% chance of leukemia in the future and neuropathy. \par Since AC is high emetogenic risk will give Aloxi 0.25 mg IV, Emend 150 mg IV and decadron 10 mg IV with each cycle. \par She needs an echo and PORT\par She is wanting to do cold caps - will arrange\par Once she completes chemo would recommend following up with Dr. Gaucher ([facility])\par Then would recommend verzenio + tamoxifen as patient has osteoporosis and does not want bisphosphonate therapy or olaparib given BRCA2 mutation. I would consider changing Tormifene to another endocrine therapy either an AI or Fulvestrant. She is concerned about the side effect profile of AIs. She is wanting to try Tamoxifen. If she does this she will need to come off wellbutrin in the future and change over to citalopram, escitalopram or venlafaxine. She will talk to her therapist about this. \par \par #BRCA 2\par PET from 5/23 showed no evidence of pancreatic uptake\par Breast MRI -MRI breast 10/22 - reviewed- no evidence of malignancy.\par Derm - had yearly skin eval May 2022 - advised to follow up again given growth on eyebrow for evaluation\par S/p mastectomy and TAHBSO\par \par #lung cancer\par with recurrence\par s/p SBRT\par \par # TIA likely 2/2 afib and aneurysm \par on eliquis\par \par #osteoporosis\par taking ca++ and vit D\par does not want bisphosphonates (oral, SQ or IV)\par \par Follow in 2 weeks with cbc with diff, cmp, CA 27-29, LDH, ESR/CRP

## 2023-07-10 ENCOUNTER — NON-APPOINTMENT (OUTPATIENT)
Age: 63
End: 2023-07-10

## 2023-07-13 ENCOUNTER — RESULT REVIEW (OUTPATIENT)
Age: 63
End: 2023-07-13

## 2023-07-19 ENCOUNTER — APPOINTMENT (OUTPATIENT)
Dept: BREAST CENTER | Facility: CLINIC | Age: 63
End: 2023-07-19
Payer: COMMERCIAL

## 2023-07-19 VITALS
OXYGEN SATURATION: 97 % | BODY MASS INDEX: 24.75 KG/M2 | SYSTOLIC BLOOD PRESSURE: 106 MMHG | HEIGHT: 64 IN | WEIGHT: 145 LBS | HEART RATE: 71 BPM | DIASTOLIC BLOOD PRESSURE: 56 MMHG

## 2023-07-19 DIAGNOSIS — Z85.41 PERSONAL HISTORY OF MALIGNANT NEOPLASM OF CERVIX UTERI: ICD-10-CM

## 2023-07-19 PROCEDURE — 99213 OFFICE O/P EST LOW 20 MIN: CPT | Mod: 24

## 2023-07-19 NOTE — HISTORY OF PRESENT ILLNESS
[FreeTextEntry1] : The patient is a 62-year-old G1, P1 postmenopausal white female of Ashkenazi [employer] descent.  She underwent menarche at age 13 and had her first child at age 34.  She underwent menopause when she had a radical TABBY/BSO in  at the time of her  when she had a significant stage III cervical cancer.  She was on Premarin for 5 years after the TABBY/BSO and then took tamoxifen for 5 years after that.  She has a strong family history of breast and ovarian cancer with her mother who  of ovarian cancer at age 45 and her maternal aunt who  from metastatic breast cancer at age 50.  The patient herself was diagnosed with a left breast upper outer quadrant breast cancer in  and underwent a left breast partial mastectomy and sentinel lymph node biopsy by Dr. Ramos at [facility] on 2013 for 1.6 cm moderately differentiated invasive duct cancer with 2 negative sentinel lymph nodes which was ER/IN strongly positive and HER2 positive by FISH with a Ki-67 of 25%.  She had a FISH ratio of 2.3 and a total HER2 count of 7.4.  The patient underwent chemotherapy at [facility] through Dr. Cotto and had Taxol with a full year of Herceptin but she refused any radiation therapy or endocrine therapy.  She then had an abnormal MRI on May 18, 2016 with a suspicious mass in the wide excision site in the left breast 1:00 region which was also seen on ultrasound is an 8 mm density and ultrasound core biopsy on May 18, 2016 showed a recurrent moderately differentiated invasive duct cancer which was again ER/IN positive and HER2 equivocal by IHC but turned out to be negative by FISH with a ratio of 1.3 and total HER2 count of 3.5.  She then tested BRCA2 positive with a 6174 deletion T mutation.  PET scan at that time showed no distant disease.  A mammography showed some other calcifications in the left breast but the patient decided to undergo bilateral mastectomies with bilateral nipple sparing technique with a prophylactic right breast mastectomy and direct to implant reconstruction with a repeat left axillary sentinel lymph node biopsy which was performed on 2016.  Final pathology showed 2 negative sentinel lymph nodes in the right breast was negative.  The left breast did show some foci of invasive duct cancer measuring 1 cm, 3 mm, and 1 cm respectively.  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  Some of the cancer was found in detached fragments which was on the anterior margin underneath the previous wide excision site.  I removed the further anterior margin which was completely negative so the final margins were all negative.  Final pathology showed the cancer to be ER/IN positive HER2 equivocal by IHC with a Ki-67 of 25% with a FISH ratio of 1.3 and total HER2 count of 3.5.  The patient saw Dr. Carrera at [facility] and an Oncotype Dx recurrence score was 22.  She underwent CMF chemotherapy which finished on 2016.  She later had her implants exchanged for cosmetic reasons with textured implants by Dr. Durán.  She was placed on toremifene.  She then underwent a right upper lobe lung resection for lung cancer by Dr. Zacarias Bunch performed in .  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  In 2021, she was admitted with a TIA and vertigo and found to have a brain aneurysm.  She also had a short bout of A. fib and has been on Eliquis.  She had a brain aneurysm clipping at [facility] in 2022.  She had been seeing Dr. Watson at [facility] and has remained on toremifene since  and has now been following up with Dr. Gregory and Dr. Pascual and was advised to come off of the toremifene because of history of a TIA/stroke but the patient will only agree to a toremifene and has been kept on Eliquis while on the medication.  She has developed a recurrence along the staple line of her right lung cancer found on recent PET scan performed in May 2023 and CT scan performed in 2023.  She also been found to have some left axillary adenopathy which was hypermetabolic on PET scan and directed ultrasound and then ultrasound-guided core biopsy was performed on May 12, 2023 of this left lower axillary finding and this showed recurrent moderately differentiated invasive duct cancer with minimal lymphoid tissue and involving skeletal muscle and fibroadipose tissue which was ER positive between 91 and 100% and IN strongly positive between 81 and 90% and this was HER2 negative by FISH with a Ki-67 between 20 and 30%.  She underwent SBRT radiation for the lung recurrence and was seen by radiation oncology as well as medical oncology and was refusing any neoadjuvant chemotherapy or chest wall radiation.  Eventually, however the patient would except local excision of this chest wall density with a lower axilla lymph node dissection which was performed on 2023.  The final pathology showed this chest wall lesion to be a completely replaced axillary lymph node and the node dissection showed a total of 7 out of 17 positive lymph nodes with 5 at the 7 involved nodes with extranodal extension and there was lymphovascular invasion present in the surrounding adipose tissue.  This remained ER/IN strongly positive and HER2 negative with a high Ki-67 at 25%.  This was a recurrent pathologic prognostic stage IIB breast cancer but pathologic anatomic stage IIIA.  She was seen by Dr. Gregory and Dr. Watson for a medical oncology evaluation postoperatively and has decided to move forward with chemotherapy but is taking a couple weeks off prior to starting any treatment.  She was also sent back to Dr. Guzman for a radiation oncology evaluation and she has agreed to regional left axillary and supraclavicular curtis irradiation.  She comes in now for postop follow-up.

## 2023-07-19 NOTE — ASSESSMENT
[FreeTextEntry1] : The patient is a 62-year-old G1, P1 postmenopausal white female of Ashkenazi [place] descent.  She underwent menarche at age 13 and had her first child at age 34.  She underwent menopause when she had a radical TABBY/BSO in  at the time of her  when she had a significant stage III cervical cancer.  She was on Premarin for 5 years after the TABBY/BSO and then took tamoxifen for 5 years after that.  She has a strong family history of breast and ovarian cancer with her mother who  of ovarian cancer at age 45 and her maternal aunt who  from metastatic breast cancer at age 50.  The patient herself was diagnosed with a left breast upper outer quadrant breast cancer in  and underwent a left breast partial mastectomy and sentinel lymph node biopsy by Dr. Ramos at [facility] on 2013 for 1.6 cm moderately differentiated invasive duct cancer with 2 negative sentinel lymph nodes which was ER/TX strongly positive and HER2 positive by FISH with a Ki-67 of 25%.  She had a FISH ratio of 2.3 and a total HER2 count of 7.4.  The patient underwent chemotherapy at [facility] through Dr. Cotto and had Taxol with a full year of Herceptin but she refused any radiation therapy or endocrine therapy.  She then had an abnormal MRI on May 18, 2016 with a suspicious mass in the wide excision site in the left breast 1:00 region which was also seen on ultrasound is an 8 mm density and ultrasound core biopsy on May 18, 2016 showed a recurrent moderately differentiated invasive duct cancer which was again ER/TX positive and HER2 equivocal by IHC but turned out to be negative by FISH with a ratio of 1.3 and total HER2 count of 3.5.  She then tested BRCA2 positive with a 6174 deletion T mutation.  PET scan at that time showed no distant disease.  A mammography showed some other calcifications in the left breast but the patient decided to undergo bilateral mastectomies with bilateral nipple sparing technique with a prophylactic right breast mastectomy and direct to implant reconstruction with a repeat left axillary sentinel lymph node biopsy which was performed on 2016.  Final pathology showed 2 negative sentinel lymph nodes and the right breast was negative.  The left breast did show some foci of invasive duct cancer measuring 1 cm, 3 mm, and 1 cm respectively.  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  Some of the cancer was found in detached fragments which was on the anterior margin underneath the previous wide excision site.  I removed the further anterior margin which was completely negative so the final margins were all negative.  Final pathology showed the cancer to be ER/TX positive HER2 equivocal by IHC with a Ki-67 of 25% with a FISH ratio of 1.3 and total HER2 count of 3.5.  The patient saw Dr. Carrera at [facility] and an Oncotype Dx recurrence score was 22.  She underwent CMF chemotherapy which finished on 2016.  She later had her implants exchanged for cosmetic reasons with textured implants by Dr. Durán.  She was placed on toremifene. She now comes in and underwent a right upper lobe lung resection for lung cancer by Dr. Zacarias Bunch performed in .  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  In 2021, she was admitted with a TIA and vertigo and found to have a brain aneurysm.  She also had a short bout of A. fib and has been on Eliquis.  She had a brain aneurysm clipping at [facility] in 2022.  She was seen by Dr. Watson at [facility] and has remained on toremifene since  and came previously in  to discuss her endocrine therapy due to her history of TIA/stroke.  Dr. Watson sent out a breast index score which was high at 6.7.  He felt she needed to stay on endocrine therapy and wanted her to come off of the toremifene given the stroke history.  I would be in general agreement with this but the patient has absolutely been refusing an AI.  The other option would be for her just to come off of the toremifene which she has been on for many years.  The patient is very concerned about vaginal atrophy and she could possibly use an AI with some vaginal estrogen creams.  She lost confidence in Dr. Watson and has been following up with Dr. Gregory and Dr. Pascual.  She refused to stop the toremifene despite the risk of TIA/stroke and she has been kept on Eliquis while on the medication. She unfortunately developed a recurrence along her right lung resection staple line which was diagnosed on an abnormal PET scan from May 2, 2023 with also abnormal finding seen on CT scan of the chest performed on 2023.  Dr. Bunch feels this was unresectable and she underwent SBRT through Dr. Guzman for the recurrent lung cancer.  She was also been found to have some left axillary adenopathy on the CT scan which was also hypermetabolic on PET scan. She does have textured implants and understands the increased risks of ALCL but does not want to undergo further surgery.  Directed ultrasound and then ultrasound-guided core biopsy was performed on May 12, 2023 of this left lower axillary finding and this showed recurrent moderately differentiated invasive duct cancer with minimal lymphoid tissue and involving skeletal muscle and fibroadipose tissue which was ER positive between 91 and 100% and TX strongly positive between 81 and 90% and this was HER2 negative by FISH with a Ki-67 between 20 and 30%.  She was seen by radiation oncology for this axillary recurrence as well as medical oncology with Dr. Watson at [facility] but refused any upfront chemotherapy and was refusing chest wall radiation.  She did allow excision of this chest wall lesion with a lower axillary lymph node dissection which was performed on 2023. The final pathology showed this chest wall lesion to be be a completely replaced axillary lymph node and the node dissection showed a total of 7 out of 17 positive lymph nodes with 5 at the 7 involved nodes with extranodal extension and there was lymphovascular invasion present in the surrounding adipose tissue.  This remained ER/TX strongly positive and HER2 negative with a high Ki-67 at 25%.  This was a recurrent pathologic prognostic stage IIB breast cancer but pathologic anatomic stage IIIA.  On exam today, she is healing well from her lower left axillary node dissection and her drain has been removed and she has no signs of any seroma but does have some axillary cording.  She was sent back to our physical therapist but is refusing physical therapy here and is using a  at her gym and seems to be getting good results.  She did see Dr. Gregory as well as Dr. Watson at [facility] for a postoperative medical oncology evaluation and chemotherapy is being recommended as well as endocrine therapy with an AI and Verzenio.  She is excepting chemotherapy but is trying to take a break for the next 2 to 3 weeks and I think that is reasonable.  She is absolutely refusing Verzenio.  She did go back to see Dr. Guzman up at [facility] for radiation oncology evaluation and will except regional left axillary curtis and supraclavicular radiation but will not except chest wall or intramammary lymph node radiation.  Otherwise, I would like to see her again in 3 months for routine follow-up.  I had a long conversation with her about what she will and will not except but it does sound like she will move forward with chemotherapy with Dr. Gregory and radiation therapy with Dr. Guzman.

## 2023-07-19 NOTE — PAST MEDICAL HISTORY
[Postmenopausal] : The patient is postmenopausal [Menarche Age ____] : age at menarche was [unfilled] [Menopause Age____] : age at menopause was [unfilled] [Total Preg ___] : G[unfilled] [Live Births ___] : P[unfilled]  [Age At Live Birth ___] : Age at live birth: [unfilled] [History of Hormone Replacement Treatment] : has no history of hormone replacement treatment [de-identified] : s/p TABBY/BSO for cervical cancer [id number]

## 2023-07-19 NOTE — PHYSICAL EXAM
[Atraumatic] : atraumatic [Normocephalic] : normocephalic [EOMI] : extra ocular movement intact [Supple] : supple [No Supraclavicular Adenopathy] : no supraclavicular adenopathy [No Cervical Adenopathy] : no cervical adenopathy [Examined in the supine and seated position] : examined in the supine and seated position [No dominant masses] : no dominant masses in right breast  [No dominant masses] : no dominant masses left breast [No Nipple Retraction] : no left nipple retraction [No Nipple Discharge] : no left nipple discharge [Breast Mass Right Breast ___cm] : no masses [Breast Mass Left Breast ___cm] : no masses [No Axillary Lymphadenopathy] : no right axillary lymphadenopathy [No Edema] : no edema [No Rashes] : no rashes [No Ulceration] : no ulceration [Breast Nipple Inversion] : nipples not inverted [Breast Nipple Retraction] : nipples not retracted [Breast Nipple Flattening] : nipples not flattened [Breast Nipple Fissures] : nipples not fissured [Breast Abnormal Lactation (Galactorrhea)] : no galactorrhea [Breast Abnormal Secretion Bloody Fluid] : no bloody discharge [Breast Abnormal Secretion Serous Fluid] : no serous discharge [Breast Abnormal Secretion Opalescent Fluid] : no milky discharge [de-identified] : On exam, the patient has obvious bilateral nipple sparing mastectomies and direct to implant reconstructions with this history of recurrent left axillary curtis metastasis in a BRCA2 positive patient.  I cannot feel any suspicious findings over either implant.  She is healing well from her left axillary lymph node dissection and she has no signs of any seroma but does have some axillary cording for which she is currently receiving treatment through her  at her gym and is refusing treatment with our physical therapist.  She does have reasonable range of motion and seems to be doing a good job. [de-identified] : Status post prophylactic nipple sparing mastectomy with direct to implant reconstruction with no suspicious findings. [de-identified] : Status post nipple sparing mastectomy with direct to implant reconstruction and now status post recent left lower axillary lymph node dissection.  Her axillary wound is healing well and she has no signs of any seroma.  She does have axillary cording and is getting therapy through her  at her gym and has having good results. [de-identified] : Status post lower left axillary lymph node dissection.

## 2023-07-19 NOTE — REVIEW OF SYSTEMS
[Feeling Tired] : feeling tired [Palpitations] : palpitations [Depression] : depression [Negative] : Heme/Lymph [de-identified] : Sure of TIA/stroke with brain aneurysm clipping

## 2023-07-19 NOTE — REASON FOR VISIT
[Post Op: _________] : a [unfilled] post op visit [FreeTextEntry1] : The patient is of Ashkenazi Confucianism descent with a family history of her mother who  of ovarian cancer at age 45 and maternal aunt who  of metastatic breast cancer at age 50.  The patient herself was diagnosed with a left breast upper outer quadrant breast cancer in  and underwent a partial mastectomy and sentinel lymph node biopsy in 2013 by Dr. Jo in [facility] for 1.6 cm moderately differentiated ductal cancer with 2 negative nodes which was triple positive for which she underwent chemotherapy with Taxol and a year of Herceptin at [facility] with Dr. Cotto.  She refused radiation therapy and endocrine therapy and eventually developed a recurrence in the left breast upper outer quadrant diagnosed after ultrasound-guided core biopsy in May 2016 and this cancer was ER/ND positive and HER2/valentino equivocal by IHC but negative by FISH.  She tested BRCA2 positive and underwent bilateral nipple sparing mastectomies with a prophylactic right breast mastectomy and repeat left axillary sentinel lymph node biopsy with direct to implant reconstruction on 2016.  Final pathology showed some residual foci of invasive duct cancer with the largest measuring 1 cm and 2 negative sentinel lymph nodes and Oncotype recurrence score was 22 and she was treated with CMF chemotherapy by Dr. Carrera at [facility].  She had her implants exchanged for cosmetic reasons by Dr. Durán.  She underwent a right upper lobe lung resection by Dr. Zacarias Bunch in  for lung cancer. She now comes in for interval follow-up after she has been found to have a recurrence of her lung cancer along the staple line in the right lung and an enlarged left axillary lymph node seen on a PET scan from May 2023 and CAT scan performed in 2023.  Ultrasound was performed and ultrasound-guided core biopsy was performed on May 12, 2023 showing recurrent moderately differentiated invasive ductal breast cancer involving skeletal muscle and adipose tissue which was ER/ND positive and HER2 negative with a high Ki-67.  She underwent SBRT radiation for the lung cancer and was seen by radiation oncology and medical oncology and she was refusing any upfront neoadjuvant chemotherapy as well as chest wall radiation but would except the chest wall excision and lower axillary lymph node sampling which was performed on 2023.  Final pathology showed a total of 7 out of 17 positive lower left axillary lymph nodes and there was extranodal extension found in 5 of the positive nodes with lymphovascular invasion present in the accompanying fatty axillary tissue.  She was sent back to medical oncology and saw Dr. Watson and Dr. Gregory for an evaluation and has agreed to chemotherapy.  She has also agreed to left axillary and supraclavicular curtis irradiation with Dr. Guzman.  She comes in now for postop follow-up.

## 2023-07-26 ENCOUNTER — APPOINTMENT (OUTPATIENT)
Dept: HEMATOLOGY ONCOLOGY | Facility: CLINIC | Age: 63
End: 2023-07-26

## 2023-07-31 ENCOUNTER — RESULT REVIEW (OUTPATIENT)
Age: 63
End: 2023-07-31

## 2023-07-31 ENCOUNTER — APPOINTMENT (OUTPATIENT)
Dept: HEMATOLOGY ONCOLOGY | Facility: CLINIC | Age: 63
End: 2023-07-31
Payer: COMMERCIAL

## 2023-07-31 VITALS
DIASTOLIC BLOOD PRESSURE: 56 MMHG | HEIGHT: 64 IN | OXYGEN SATURATION: 97 % | BODY MASS INDEX: 24.75 KG/M2 | HEART RATE: 71 BPM | SYSTOLIC BLOOD PRESSURE: 106 MMHG | TEMPERATURE: 98.5 F | WEIGHT: 145 LBS | RESPIRATION RATE: 16 BRPM

## 2023-07-31 PROCEDURE — 99215 OFFICE O/P EST HI 40 MIN: CPT | Mod: 25

## 2023-07-31 PROCEDURE — 36415 COLL VENOUS BLD VENIPUNCTURE: CPT

## 2023-07-31 NOTE — CONSULT LETTER
[FreeTextEntry3] : Heidi Gregory DO, FACJOSELITO, FACP\par Medical Oncology and Hematology\par [facility]\par

## 2023-07-31 NOTE — ASSESSMENT
[FreeTextEntry1] : #Breast cancer 2013- left breast upper outer IDC; s/p partial mastectomy + sentinel lymph node biopsy Dr. Jo ([place]) 1.6cm moderately differentiated IDC with 2 negative nodes, triple positive, s/p taxol + herceptin x12 months Dr. Cotto [place] She refused XRT and endocrine therapy and eventually developed recurrence in the left breast upper outer quadrant 2016 ER/MS positive, HER 2 equivocal by IHC, negative by FISH; BRCA positive with 6174 deletion T mutation s/p bilateral nipple sparing mastectomies, repeat left sentinel node biopsy, with direct to implant reconstruction Path: residual foci IDC, largest measuring 1cm and 2 negative nodes, oncotype 22 s/p CMF Dr. Carrera ([place]) 12/2016,  patient not willing to take AI or tamoxifen and placed on toremifene  s/p implant exchange Dr. Durán  Patient refuses AI or Tamoxifen. She has been on Tormefine in the past. We have discussed that this is typically not the medication we use in the adjuvant setting and that this is used in the metastatic setting. We have also discussed that this increases her risk of VTE and TIA/stroke. She understands her risk and would still like to proceed. I have advised that she remain on Eliquis while she is on this medication. I do believe that given her history of ER+ breast cancer that she would benefit from Endocrine therapy and she completely understands the risks but wants to proceed with Tormefine.  7/6/23 - vs reviewed. labs drawn in office today. Reviewed PET report and final pathology showed a total of 7 out of 17 positive lower left axillary lymph nodes and there was extranodal extension found in 5 of the positive nodes with lymphovascular invasion present in the accompanying fatty axillary tissue. We discussed the recommendation of chemotherapy with ddAC -->T vs TC. She is concerned about permanent alopecia associated with Taxotere. Reviewed side effect profile of dd AC --> T. Adriamycin 60 mg/m2 and cytoxan 600 mg/m2 q 2 weeks for 4 cycles with onpro for bone marrow convalescence. This will be followed by Taxol 80 mg/m2 weekly x 12 doses. We have reviewed the side effects to include but are not limited to cytopenias with increased risk of infection, sepsis and even death, N/V, Fatigue, alopecia, <1% chance of leukemia in the future and neuropathy.  Since AC is high emetogenic risk will give Aloxi 0.25 mg IV, Emend 150 mg IV and decadron 10 mg IV with each cycle.  will need follow up with Dr. Zamorano Then would recommend verzenio + tamoxifen as patient has osteoporosis and does not want bisphosphonate therapy or olaparib given BRCA2 mutation. I would consider changing Tormifene to another endocrine therapy either an AI or Fulvestrant. She is concerned about the side effect profile of AIs. She is wanting to try Tamoxifen. If she does this she will need to come off wellbutrin in the future and change over to citalopram, escitalopram or venlafaxine. She will talk to her therapist about this.  7/31/23 - vs reviewed.  Had port placed today. Reviewed echo - EF wnl. Acceptable for treatment. CT chest stable lung nodules - no evidence of recurrent malignancy.  Reviewed side effect profile again of chemo. All questions have been addressed and answered to patients satisfaction. She is seeing the dentist for evaluation and will call when ready for chemo.  #BRCA 2 PET from 5/23 showed no evidence of pancreatic uptake. will need MRI pancreas 5/24 Breast MRI -MRI breast 10/22 - reviewed- no evidence of malignancy. Derm - had yearly skin eval May 2022 - advised to follow up again given growth on eyebrow for evaluation S/p mastectomy and TAHBSO  #lung cancer with recurrence s/p SBRT  # TIA likely 2/2 afib and aneurysm  on eliquis  #osteoporosis taking ca++ and vit D does not want bisphosphonates (oral, SQ or IV)  Follow in 10 days with cbc with diff, cmp, CA 27-29, LDH, ESR/CRP

## 2023-07-31 NOTE — HISTORY OF PRESENT ILLNESS
[de-identified] : Ms. Roman is a 61 year old woman who presents for endocrine therapy consultation. Patient is on toremifene because she takes wellbutrin which is contraindicated with tamoxifen. Patient is seeing us today because her current oncologist does not want her on toremifene and wants to place her on an AI but patient is refusing. Patient states that last week she experienced her first migraine since her brain surgery and states that she left a note on her night stand for her daughter because the patient did not believe she would wake up. Patient states that she has PTSD due to her medical history. Patients goal is to be treated by Dr. Gregory Because she is passionate about her quality of life over her quantity. She wishes to be viewed as a whole person and is advocating to be placed back on toremifene. \par \par DEXA shows osteopenia- patient denied Fosamax \par \par Patient had a tumor removed from her chest cavity at 15. Cervical Ca dx 10 weeks into her 1st pregnancy at 34 years old. \par \par 2013- left breast upper outer IDC; s/p partial mastectomy + sentinel lymph node biopsy Dr. Jo ([place])\par 1.6cm moderately differentiated IDC with 2 negative nodes, triple positive, s/p taxol + herceptin x12 months Dr. Cotto ([facility]par She refused XRT and endocrine therapy and eventually developed recurrence in the left breast upper outer quadrant\par \par 2016 ER/TX positive, HER 2 equivical by IHC, negative by FISH; BRCA positive with 6174 deletion T mutation\par s/p bilateral nipple sparing mastectomies, repeat left sentinel node biopsy, with direct to implant reconstruction\par Path: residual foci IDC, largest measuring 1cm and 2 negative nodes, oncotype 22\par s/p CMF Dr. Carrera ([place]) 12/2016,  patient not willing to take AI or tamoxifen and placed on toremifene \par s/p implant exchange Dr. Durán \par \par 2020 s/p RUL resection Dr. Zacarias Frye\par \par 10/2021 admitted for TIA and vertigo given TPA but found to have brain aneurysm; placed on eliquis\par \par 2/2022 brain aneurysm clipping at [facility] followed by Dr. 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

## 2023-08-09 ENCOUNTER — NON-APPOINTMENT (OUTPATIENT)
Age: 63
End: 2023-08-09

## 2023-08-10 ENCOUNTER — RESULT REVIEW (OUTPATIENT)
Age: 63
End: 2023-08-10

## 2023-08-10 ENCOUNTER — NON-APPOINTMENT (OUTPATIENT)
Age: 63
End: 2023-08-10

## 2023-08-10 ENCOUNTER — APPOINTMENT (OUTPATIENT)
Dept: HEMATOLOGY ONCOLOGY | Facility: CLINIC | Age: 63
End: 2023-08-10

## 2023-08-10 VITALS
DIASTOLIC BLOOD PRESSURE: 51 MMHG | HEART RATE: 66 BPM | BODY MASS INDEX: 24.75 KG/M2 | TEMPERATURE: 97.5 F | RESPIRATION RATE: 16 BRPM | OXYGEN SATURATION: 98 % | SYSTOLIC BLOOD PRESSURE: 122 MMHG | WEIGHT: 145 LBS | HEIGHT: 64 IN

## 2023-08-10 DIAGNOSIS — Z20.2 CONTACT WITH AND (SUSPECTED) EXPOSURE TO INFECTIONS WITH A PREDOMINANTLY SEXUAL MODE OF TRANSMISSION: ICD-10-CM

## 2023-08-11 ENCOUNTER — NON-APPOINTMENT (OUTPATIENT)
Age: 63
End: 2023-08-11

## 2023-08-15 ENCOUNTER — APPOINTMENT (OUTPATIENT)
Dept: HEMATOLOGY ONCOLOGY | Facility: CLINIC | Age: 63
End: 2023-08-15

## 2023-08-16 ENCOUNTER — NON-APPOINTMENT (OUTPATIENT)
Age: 63
End: 2023-08-16

## 2023-08-31 ENCOUNTER — RESULT REVIEW (OUTPATIENT)
Age: 63
End: 2023-08-31

## 2023-08-31 ENCOUNTER — APPOINTMENT (OUTPATIENT)
Dept: HEMATOLOGY ONCOLOGY | Facility: CLINIC | Age: 63
End: 2023-08-31

## 2023-08-31 ENCOUNTER — NON-APPOINTMENT (OUTPATIENT)
Age: 63
End: 2023-08-31

## 2023-08-31 ENCOUNTER — APPOINTMENT (OUTPATIENT)
Dept: HEMATOLOGY ONCOLOGY | Facility: CLINIC | Age: 63
End: 2023-08-31
Payer: COMMERCIAL

## 2023-08-31 VITALS
OXYGEN SATURATION: 99 % | RESPIRATION RATE: 16 BRPM | SYSTOLIC BLOOD PRESSURE: 109 MMHG | TEMPERATURE: 97 F | DIASTOLIC BLOOD PRESSURE: 52 MMHG | HEIGHT: 64 IN | HEART RATE: 81 BPM | WEIGHT: 145 LBS | BODY MASS INDEX: 24.75 KG/M2

## 2023-08-31 VITALS
SYSTOLIC BLOOD PRESSURE: 109 MMHG | TEMPERATURE: 97 F | BODY MASS INDEX: 24.75 KG/M2 | RESPIRATION RATE: 16 BRPM | OXYGEN SATURATION: 99 % | HEIGHT: 64 IN | DIASTOLIC BLOOD PRESSURE: 52 MMHG | WEIGHT: 145 LBS | HEART RATE: 81 BPM

## 2023-08-31 PROCEDURE — 99214 OFFICE O/P EST MOD 30 MIN: CPT | Mod: 25

## 2023-08-31 PROCEDURE — 36415 COLL VENOUS BLD VENIPUNCTURE: CPT

## 2023-08-31 NOTE — ASSESSMENT
[FreeTextEntry1] : #Breast cancer 2013- left breast upper outer IDC; s/p partial mastectomy + sentinel lymph node biopsy Dr. Jo ([place]) 1.6cm moderately differentiated IDC with 2 negative nodes, triple positive, s/p taxol + herceptin x12 months Dr. Cotto ([facility] [place]) She refused XRT and endocrine therapy and eventually developed recurrence in the left breast upper outer quadrant 2016 ER/[place] positive, HER 2 equivocal by IHC, negative by FISH; BRCA positive with 6174 deletion T mutation s/p bilateral nipple sparing mastectomies, repeat left sentinel node biopsy, with direct to implant reconstruction Path: residual foci IDC, largest measuring 1cm and 2 negative nodes, oncotype 22 s/p CMF Dr. Carrera ([place]) 12/2016,  patient not willing to take AI or tamoxifen and placed on toremifene  s/p implant exchange Dr. Durán  Patient refuses AI or Tamoxifen. She has been on Tormefine in the past. We have discussed that this is typically not the medication we use in the adjuvant setting and that this is used in the metastatic setting. We have also discussed that this increases her risk of VTE and TIA/stroke. She understands her risk and would still like to proceed. I have advised that she remain on Eliquis while she is on this medication. I do believe that given her history of ER+ breast cancer that she would benefit from Endocrine therapy and she completely understands the risks but wants to proceed with Tormefine.  7/6/23 - vs reviewed. labs drawn in office today. Reviewed PET report and final pathology showed a total of 7 out of 17 positive lower left axillary lymph nodes and there was extranodal extension found in 5 of the positive nodes with lymphovascular invasion present in the accompanying fatty axillary tissue. We discussed the recommendation of chemotherapy with ddAC -->T vs TC. She is concerned about permanent alopecia associated with Taxotere. Reviewed side effect profile of dd AC --> T. Adriamycin 60 mg/m2 and cytoxan 600 mg/m2 q 2 weeks for 4 cycles with onpro for bone marrow convalescence. This will be followed by Taxol 80 mg/m2 weekly x 12 doses. We have reviewed the side effects to include but are not limited to cytopenias with increased risk of infection, sepsis and even death, N/V, Fatigue, alopecia, <1% chance of leukemia in the future and neuropathy.  Since AC is high emetogenic risk will give Aloxi 0.25 mg IV, Emend 150 mg IV and decadron 10 mg IV with each cycle.  will need follow up with Dr. Zamorano Then would recommend verzenio + tamoxifen as patient has osteoporosis and does not want bisphosphonate therapy or olaparib given BRCA2 mutation. I would consider changing Tormifene to another endocrine therapy either an AI or Fulvestrant. She is concerned about the side effect profile of AIs. She is wanting to try Tamoxifen. If she does this she will need to come off wellbutrin in the future and change over to citalopram, escitalopram or venlafaxine. She will talk to her therapist about this.  7/31/23 - vs reviewed.  Had port placed today. Reviewed echo - EF wnl. Acceptable for treatment. CT chest stable lung nodules - no evidence of recurrent malignancy.  Reviewed side effect profile again of chemo. All questions have been addressed and answered to patients satisfaction. She is seeing the dentist for evaluation and will call when ready for chemo. 8/31/23 - vs and labs reviewed. labs drawn in office today. wbc, hgb and plts wnl. slightly elevated ALKP - monitor otherwise kidney function and liver function wnl. proceed with cycle 2 of ddAC with onpro.  #BRCA 2 PET from 5/23 showed no evidence of pancreatic uptake. will need MRI pancreas 5/24 Breast MRI -MRI breast 10/22 - reviewed- no evidence of malignancy. Derm - had yearly skin eval May 2022 - advised to follow up again given growth on eyebrow for evaluation. repeat derm eval yearly S/p mastectomy and TAHBSO  #lung cancer with recurrence s/p SBRT  # TIA likely 2/2 afib and aneurysm  on eliquis  #osteoporosis taking ca++ and vit D does not want bisphosphonates (oral, SQ or IV)  Follow in 2 weeks with cbc with diff, cmp, LDH, ESR/CRP

## 2023-08-31 NOTE — CONSULT LETTER
[FreeTextEntry3] : Heidi Gregory DO, FACJOSELITO, FACP\par  Medical Oncology and Hematology\par  [facility] [place]\par

## 2023-08-31 NOTE — HISTORY OF PRESENT ILLNESS
[de-identified] : Ms. Roman is a 61 year old woman who presents for endocrine therapy consultation. Patient is on toremifene because she takes wellbutrin which is contraindicated with tamoxifen. Patient is seeing us today because her current oncologist does not want her on toremifene and wants to place her on an AI but patient is refusing. Patient states that last week she experienced her first migraine since her brain surgery and states that she left a note on her night stand for her daughter because the patient did not believe she would wake up. Patient states that she has PTSD due to her medical history. Patients goal is to be treated by Dr. Gregory Because she is passionate about her quality of life over her quantity. She wishes to be viewed as a whole person and is advocating to be placed back on toremifene. \par  \par  DEXA shows osteopenia- patient denied Fosamax \par  \par  Patient had a tumor removed from her chest cavity at 15. Cervical Ca dx 10 weeks into her 1st pregnancy at 34 years old. \par  \par  2013- left breast upper outer IDC; s/p partial mastectomy + sentinel lymph node biopsy Dr. Jo ([place])\par  1.6cm moderately differentiated IDC with 2 negative nodes, triple positive, s/p taxol + herceptin x12 months Dr. Cotto ([facility] [place])\par  She refused XRT and endocrine therapy and eventually developed recurrence in the left breast upper outer quadrant\par  \par  2016 ER/[place] positive, HER 2 equivical by IHC, negative by FISH; BRCA positive with 6174 deletion T mutation\par  s/p bilateral nipple sparing mastectomies, repeat left sentinel node biopsy, with direct to implant reconstruction\par  Path: residual foci IDC, largest measuring 1cm and 2 negative nodes, oncotype 22\par  s/p CMF Dr. Carrera ([place]) 12/2016,  patient not willing to take AI or tamoxifen and placed on toremifene \par  s/p implant exchange Dr. Durán \par  \par  2020 s/p RUL resection Dr. Zacarias Frye\par  \par  10/2021 admitted for TIA and vertigo given TPA but found to have brain aneurysm; placed on eliquis\par  \par  2/2022 brain aneurysm clipping at [facility] followed by Dr. 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 had some hair loss - planning on shaving head tonight had some nausea but relieved by zofran fatigue on Friday but that resolved

## 2023-09-06 ENCOUNTER — NON-APPOINTMENT (OUTPATIENT)
Age: 63
End: 2023-09-06

## 2023-09-07 ENCOUNTER — NON-APPOINTMENT (OUTPATIENT)
Age: 63
End: 2023-09-07

## 2023-09-08 NOTE — CONSULT LETTER
[Dear  ___] : Dear  [unfilled], [Consult Letter:] : I had the pleasure of evaluating your patient, [unfilled]. [Please see my note below.] : Please see my note below. [Consult Closing:] : Thank you very much for allowing me to participate in the care of this patient.  If you have any questions, please do not hesitate to contact me. [Sincerely,] : Sincerely, [FreeTextEntry3] : Heidi Gregory DO, FACJOSELITO, FACP\par  Medical Oncology and Hematology\par  [facility] [place]\par

## 2023-09-08 NOTE — HISTORY OF PRESENT ILLNESS
[de-identified] : Ms. Roman is a 61 year old woman who presents for endocrine therapy consultation. Patient is on toremifene because she takes wellbutrin which is contraindicated with tamoxifen. Patient is seeing us today because her current oncologist does not want her on toremifene and wants to place her on an AI but patient is refusing. Patient states that last week she experienced her first migraine since her brain surgery and states that she left a note on her night stand for her daughter because the patient did not believe she would wake up. Patient states that she has PTSD due to her medical history. Patients goal is to be treated by Dr. Gregory Because she is passionate about her quality of life over her quantity. She wishes to be viewed as a whole person and is advocating to be placed back on toremifene. \par  \par  DEXA shows osteopenia- patient denied Fosamax \par  \par  Patient had a tumor removed from her chest cavity at 15. Cervical Ca dx 10 weeks into her 1st pregnancy at 34 years old. \par  \par  2013- left breast upper outer IDC; s/p partial mastectomy + sentinel lymph node biopsy Dr. Jo ([place])\par  1.6cm moderately differentiated IDC with 2 negative nodes, triple positive, s/p taxol + herceptin x12 months Dr. Cotto [place] [place]par  She refused XRT and endocrine therapy and eventually developed recurrence in the left breast upper outer quadrant\par  \par  2016 ER/[place] positive, HER 2 equivical by IHC, negative by FISH; BRCA positive with 6174 deletion T mutation\par  s/p bilateral nipple sparing mastectomies, repeat left sentinel node biopsy, with direct to implant reconstruction\par  Path: residual foci IDC, largest measuring 1cm and 2 negative nodes, oncotype 22\par  s/p CMF Dr. Carrera ([place]) 12/2016,  patient not willing to take AI or tamoxifen and placed on toremifene \par  s/p implant exchange Dr. Durán \par  \par  2020 s/p RUL resection Dr. Zacarias Frye\par  \par  10/2021 admitted for TIA and vertigo given TPA but found to have brain aneurysm; placed on eliquis\par  \par  2/2022 brain aneurysm clipping at [facility] followed by Dr. 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 had some hair loss - planning on shaving head tonight had some nausea but relieved by zofran fatigue on Friday but that resolved

## 2023-09-08 NOTE — ASSESSMENT
[FreeTextEntry1] : #Breast cancer 2013- left breast upper outer IDC; s/p partial mastectomy + sentinel lymph node biopsy Dr. Jo ([place]) 1.6cm moderately differentiated IDC with 2 negative nodes, triple positive, s/p taxol + [place]in [place]12 [place] [place] refused XRT and endocrine therapy and eventually developed recurrence in the left breast upper outer quadrant 2016 ER/[place] positive, HER 2 equivocal by IHC, negative by FISH; BRCA positive with 6174 deletion T mutation s/p bilateral nipple sparing mastectomies, repeat left sentinel node biopsy, with direct to implant reconstruction Path: residual foci IDC, largest measuring 1cm and 2 negative nodes, oncotype 22 s/p CMF Dr. Carrera ([place]) 12/2016,  patient not willing to take AI or tamoxifen and placed on toremifene  s/p implant exchange Dr. Durán  Patient refuses AI or Tamoxifen. She has been on Tormefine in the past. We have discussed that this is typically not the medication we use in the adjuvant setting and that this is used in the metastatic setting. We have also discussed that this increases her risk of VTE and TIA/stroke. She understands her risk and would still like to proceed. I have advised that she remain on Eliquis while she is on this medication. I do believe that given her history of ER+ breast cancer that she would benefit from Endocrine therapy and she completely understands the risks but wants to proceed with Tormefine.  7/6/23 - vs reviewed. labs drawn in office today. Reviewed PET report and final pathology showed a total of 7 out of 17 positive lower left axillary lymph nodes and there was extranodal extension found in 5 of the positive nodes with lymphovascular invasion present in the accompanying fatty axillary tissue. We discussed the recommendation of chemotherapy with ddAC -->T vs TC. She is concerned about permanent alopecia associated with Taxotere. Reviewed side effect profile of dd AC --> T. Adriamycin 60 mg/m2 and cytoxan 600 mg/m2 q 2 weeks for 4 cycles with onpro for bone marrow convalescence. This will be followed by Taxol 80 mg/m2 weekly x 12 doses. We have reviewed the side effects to include but are not limited to cytopenias with increased risk of infection, sepsis and even death, N/V, Fatigue, alopecia, <1% chance of leukemia in the future and neuropathy.  Since AC is high emetogenic risk will give Aloxi 0.25 mg IV, Emend 150 mg IV and decadron 10 mg IV with each cycle.  will need follow up with Dr. Zamorano Then would recommend verzenio + tamoxifen as patient has osteoporosis and does not want bisphosphonate therapy or olaparib given BRCA2 mutation. I would consider changing Tormifene to another endocrine therapy either an AI or Fulvestrant. She is concerned about the side effect profile of AIs. She is wanting to try Tamoxifen. If she does this she will need to come off wellbutrin in the future and change over to citalopram, escitalopram or venlafaxine. She will talk to her therapist about this.  7/31/23 - vs reviewed.  Had port placed today. Reviewed echo - EF wnl. Acceptable for treatment. CT chest stable lung nodules - no evidence of recurrent malignancy.  Reviewed side effect profile again of chemo. All questions have been addressed and answered to patients satisfaction. She is seeing the dentist for evaluation and will call when ready for chemo. 8/31/23 - vs and labs reviewed. labs drawn in office today. wbc, hgb and plts wnl. slightly elevated ALKP - monitor otherwise kidney function and liver function wnl. proceed with cycle 2 of ddAC with onpro.  #BRCA 2 PET from 5/23 showed no evidence of pancreatic uptake. will need MRI pancreas 5/24 Breast MRI -MRI breast 10/22 - reviewed- no evidence of malignancy. Derm - had yearly skin eval May 2022 - advised to follow up again given growth on eyebrow for evaluation. repeat derm eval yearly S/p mastectomy and TAHBSO  #lung cancer with recurrence s/p SBRT  # TIA likely 2/2 afib and aneurysm  on eliquis  #osteoporosis taking ca++ and vit D does not want bisphosphonates (oral, SQ or IV)  Follow in 2 weeks with cbc with diff, cmp, LDH, ESR/CRP

## 2023-09-11 ENCOUNTER — RESULT REVIEW (OUTPATIENT)
Age: 63
End: 2023-09-11

## 2023-09-14 ENCOUNTER — RESULT REVIEW (OUTPATIENT)
Age: 63
End: 2023-09-14

## 2023-09-14 ENCOUNTER — APPOINTMENT (OUTPATIENT)
Dept: HEMATOLOGY ONCOLOGY | Facility: CLINIC | Age: 63
End: 2023-09-14
Payer: COMMERCIAL

## 2023-09-14 VITALS
RESPIRATION RATE: 16 BRPM | DIASTOLIC BLOOD PRESSURE: 55 MMHG | HEIGHT: 64 IN | OXYGEN SATURATION: 99 % | BODY MASS INDEX: 24.41 KG/M2 | WEIGHT: 143 LBS | SYSTOLIC BLOOD PRESSURE: 112 MMHG | HEART RATE: 70 BPM | TEMPERATURE: 97 F

## 2023-09-14 DIAGNOSIS — I48.91 UNSPECIFIED ATRIAL FIBRILLATION: ICD-10-CM

## 2023-09-14 PROCEDURE — 99214 OFFICE O/P EST MOD 30 MIN: CPT

## 2023-09-18 ENCOUNTER — RESULT REVIEW (OUTPATIENT)
Age: 63
End: 2023-09-18

## 2023-09-28 ENCOUNTER — APPOINTMENT (OUTPATIENT)
Dept: HEMATOLOGY ONCOLOGY | Facility: CLINIC | Age: 63
End: 2023-09-28
Payer: COMMERCIAL

## 2023-09-28 ENCOUNTER — RESULT REVIEW (OUTPATIENT)
Age: 63
End: 2023-09-28

## 2023-09-28 VITALS
HEIGHT: 64 IN | BODY MASS INDEX: 24.75 KG/M2 | HEART RATE: 73 BPM | WEIGHT: 145 LBS | DIASTOLIC BLOOD PRESSURE: 50 MMHG | RESPIRATION RATE: 16 BRPM | OXYGEN SATURATION: 98 % | SYSTOLIC BLOOD PRESSURE: 103 MMHG | TEMPERATURE: 97.2 F

## 2023-09-28 DIAGNOSIS — R25.2 CRAMP AND SPASM: ICD-10-CM

## 2023-09-28 PROCEDURE — 99215 OFFICE O/P EST HI 40 MIN: CPT | Mod: 25

## 2023-09-28 PROCEDURE — 36415 COLL VENOUS BLD VENIPUNCTURE: CPT

## 2023-10-12 ENCOUNTER — RESULT REVIEW (OUTPATIENT)
Age: 63
End: 2023-10-12

## 2023-10-12 ENCOUNTER — APPOINTMENT (OUTPATIENT)
Dept: HEMATOLOGY ONCOLOGY | Facility: CLINIC | Age: 63
End: 2023-10-12
Payer: COMMERCIAL

## 2023-10-12 VITALS
WEIGHT: 145 LBS | HEART RATE: 87 BPM | HEIGHT: 64 IN | TEMPERATURE: 97.2 F | OXYGEN SATURATION: 97 % | RESPIRATION RATE: 16 BRPM | DIASTOLIC BLOOD PRESSURE: 49 MMHG | BODY MASS INDEX: 24.75 KG/M2 | SYSTOLIC BLOOD PRESSURE: 109 MMHG

## 2023-10-12 PROCEDURE — 99215 OFFICE O/P EST HI 40 MIN: CPT

## 2023-10-17 ENCOUNTER — APPOINTMENT (OUTPATIENT)
Dept: BREAST CENTER | Facility: CLINIC | Age: 63
End: 2023-10-17
Payer: COMMERCIAL

## 2023-10-17 VITALS
OXYGEN SATURATION: 97 % | WEIGHT: 144 LBS | HEART RATE: 100 BPM | BODY MASS INDEX: 24.59 KG/M2 | DIASTOLIC BLOOD PRESSURE: 65 MMHG | HEIGHT: 64 IN | SYSTOLIC BLOOD PRESSURE: 108 MMHG

## 2023-10-17 PROCEDURE — 99213 OFFICE O/P EST LOW 20 MIN: CPT

## 2023-10-19 ENCOUNTER — RESULT REVIEW (OUTPATIENT)
Age: 63
End: 2023-10-19

## 2023-10-19 ENCOUNTER — APPOINTMENT (OUTPATIENT)
Dept: HEMATOLOGY ONCOLOGY | Facility: CLINIC | Age: 63
End: 2023-10-19
Payer: COMMERCIAL

## 2023-10-19 VITALS
TEMPERATURE: 98 F | HEART RATE: 91 BPM | OXYGEN SATURATION: 95 % | WEIGHT: 144 LBS | SYSTOLIC BLOOD PRESSURE: 102 MMHG | RESPIRATION RATE: 16 BRPM | HEIGHT: 64 IN | BODY MASS INDEX: 24.59 KG/M2 | DIASTOLIC BLOOD PRESSURE: 53 MMHG

## 2023-10-19 DIAGNOSIS — E87.6 HYPOKALEMIA: ICD-10-CM

## 2023-10-19 PROCEDURE — 36415 COLL VENOUS BLD VENIPUNCTURE: CPT

## 2023-10-19 PROCEDURE — 99214 OFFICE O/P EST MOD 30 MIN: CPT | Mod: 25

## 2023-10-26 ENCOUNTER — RESULT REVIEW (OUTPATIENT)
Age: 63
End: 2023-10-26

## 2023-10-26 ENCOUNTER — APPOINTMENT (OUTPATIENT)
Dept: HEMATOLOGY ONCOLOGY | Facility: CLINIC | Age: 63
End: 2023-10-26
Payer: COMMERCIAL

## 2023-10-26 VITALS
HEART RATE: 89 BPM | RESPIRATION RATE: 16 BRPM | TEMPERATURE: 96.9 F | BODY MASS INDEX: 24.07 KG/M2 | OXYGEN SATURATION: 96 % | WEIGHT: 141 LBS | DIASTOLIC BLOOD PRESSURE: 52 MMHG | SYSTOLIC BLOOD PRESSURE: 104 MMHG | HEIGHT: 64 IN

## 2023-10-26 DIAGNOSIS — R59.0 LOCALIZED ENLARGED LYMPH NODES: ICD-10-CM

## 2023-10-26 PROCEDURE — 99214 OFFICE O/P EST MOD 30 MIN: CPT | Mod: 25

## 2023-10-26 PROCEDURE — 36415 COLL VENOUS BLD VENIPUNCTURE: CPT

## 2023-11-02 ENCOUNTER — RESULT REVIEW (OUTPATIENT)
Age: 63
End: 2023-11-02

## 2023-11-02 ENCOUNTER — APPOINTMENT (OUTPATIENT)
Dept: HEMATOLOGY ONCOLOGY | Facility: CLINIC | Age: 63
End: 2023-11-02
Payer: COMMERCIAL

## 2023-11-02 VITALS
OXYGEN SATURATION: 96 % | DIASTOLIC BLOOD PRESSURE: 50 MMHG | HEART RATE: 71 BPM | HEIGHT: 64 IN | WEIGHT: 140 LBS | BODY MASS INDEX: 23.9 KG/M2 | SYSTOLIC BLOOD PRESSURE: 112 MMHG | TEMPERATURE: 97.4 F | RESPIRATION RATE: 16 BRPM

## 2023-11-02 PROCEDURE — 99214 OFFICE O/P EST MOD 30 MIN: CPT

## 2023-11-09 ENCOUNTER — RESULT REVIEW (OUTPATIENT)
Age: 63
End: 2023-11-09

## 2023-11-09 ENCOUNTER — APPOINTMENT (OUTPATIENT)
Dept: HEMATOLOGY ONCOLOGY | Facility: CLINIC | Age: 63
End: 2023-11-09
Payer: COMMERCIAL

## 2023-11-09 VITALS
HEIGHT: 64 IN | DIASTOLIC BLOOD PRESSURE: 60 MMHG | OXYGEN SATURATION: 99 % | BODY MASS INDEX: 23.9 KG/M2 | TEMPERATURE: 98 F | SYSTOLIC BLOOD PRESSURE: 112 MMHG | HEART RATE: 77 BPM | RESPIRATION RATE: 16 BRPM | WEIGHT: 140 LBS

## 2023-11-09 PROCEDURE — 36415 COLL VENOUS BLD VENIPUNCTURE: CPT

## 2023-11-09 PROCEDURE — 99214 OFFICE O/P EST MOD 30 MIN: CPT | Mod: 25

## 2023-11-16 ENCOUNTER — RESULT REVIEW (OUTPATIENT)
Age: 63
End: 2023-11-16

## 2023-11-16 ENCOUNTER — APPOINTMENT (OUTPATIENT)
Dept: HEMATOLOGY ONCOLOGY | Facility: CLINIC | Age: 63
End: 2023-11-16
Payer: COMMERCIAL

## 2023-11-16 VITALS
WEIGHT: 144 LBS | RESPIRATION RATE: 16 BRPM | HEART RATE: 73 BPM | HEIGHT: 64 IN | TEMPERATURE: 97 F | DIASTOLIC BLOOD PRESSURE: 56 MMHG | OXYGEN SATURATION: 98 % | SYSTOLIC BLOOD PRESSURE: 116 MMHG | BODY MASS INDEX: 24.59 KG/M2

## 2023-11-16 PROCEDURE — 99214 OFFICE O/P EST MOD 30 MIN: CPT

## 2023-11-30 ENCOUNTER — APPOINTMENT (OUTPATIENT)
Dept: HEMATOLOGY ONCOLOGY | Facility: CLINIC | Age: 63
End: 2023-11-30
Payer: COMMERCIAL

## 2023-11-30 ENCOUNTER — RESULT REVIEW (OUTPATIENT)
Age: 63
End: 2023-11-30

## 2023-11-30 VITALS
DIASTOLIC BLOOD PRESSURE: 55 MMHG | WEIGHT: 145 LBS | SYSTOLIC BLOOD PRESSURE: 112 MMHG | BODY MASS INDEX: 24.75 KG/M2 | RESPIRATION RATE: 18 BRPM | OXYGEN SATURATION: 100 % | HEIGHT: 64 IN | TEMPERATURE: 97.4 F | HEART RATE: 78 BPM

## 2023-11-30 PROCEDURE — 99214 OFFICE O/P EST MOD 30 MIN: CPT | Mod: 25

## 2023-11-30 PROCEDURE — 36415 COLL VENOUS BLD VENIPUNCTURE: CPT

## 2023-12-07 ENCOUNTER — RESULT REVIEW (OUTPATIENT)
Age: 63
End: 2023-12-07

## 2023-12-07 ENCOUNTER — APPOINTMENT (OUTPATIENT)
Dept: HEMATOLOGY ONCOLOGY | Facility: CLINIC | Age: 63
End: 2023-12-07
Payer: COMMERCIAL

## 2023-12-07 VITALS
DIASTOLIC BLOOD PRESSURE: 48 MMHG | HEART RATE: 78 BPM | HEIGHT: 64 IN | OXYGEN SATURATION: 99 % | RESPIRATION RATE: 16 BRPM | WEIGHT: 145 LBS | TEMPERATURE: 97.4 F | SYSTOLIC BLOOD PRESSURE: 100 MMHG | BODY MASS INDEX: 24.75 KG/M2

## 2023-12-07 PROCEDURE — 99214 OFFICE O/P EST MOD 30 MIN: CPT

## 2023-12-07 RX ORDER — LIDOCAINE AND PRILOCAINE 25; 25 MG/G; MG/G
2.5-2.5 CREAM TOPICAL
Qty: 1 | Refills: 1 | Status: ACTIVE | COMMUNITY
Start: 2023-08-04 | End: 1900-01-01

## 2023-12-14 ENCOUNTER — APPOINTMENT (OUTPATIENT)
Dept: HEMATOLOGY ONCOLOGY | Facility: CLINIC | Age: 63
End: 2023-12-14
Payer: COMMERCIAL

## 2023-12-14 ENCOUNTER — RESULT REVIEW (OUTPATIENT)
Age: 63
End: 2023-12-14

## 2023-12-14 VITALS
DIASTOLIC BLOOD PRESSURE: 60 MMHG | BODY MASS INDEX: 24.75 KG/M2 | WEIGHT: 145 LBS | TEMPERATURE: 97 F | HEIGHT: 64 IN | OXYGEN SATURATION: 99 % | HEART RATE: 70 BPM | RESPIRATION RATE: 16 BRPM | SYSTOLIC BLOOD PRESSURE: 116 MMHG

## 2023-12-14 PROCEDURE — 99214 OFFICE O/P EST MOD 30 MIN: CPT | Mod: 25

## 2023-12-14 PROCEDURE — 36415 COLL VENOUS BLD VENIPUNCTURE: CPT

## 2023-12-14 NOTE — CONSULT LETTER
[FreeTextEntry3] : Heidi Gregory DO, FACJOSELITO, FACP\par  Medical Oncology and Hematology\par  [facility] [place]\par

## 2023-12-14 NOTE — HISTORY OF PRESENT ILLNESS
[de-identified] : Ms. Roman is a 61 year old woman who presents for endocrine therapy consultation. Patient is on toremifene because she takes wellbutrin which is contraindicated with tamoxifen. Patient is seeing us today because her current oncologist does not want her on toremifene and wants to place her on an AI but patient is refusing. Patient states that last week she experienced her first migraine since her brain surgery and states that she left a note on her night stand for her daughter because the patient did not believe she would wake up. Patient states that she has PTSD due to her medical history. Patients goal is to be treated by Dr. Gregory Because she is passionate about her quality of life over her quantity. She wishes to be viewed as a whole person and is advocating to be placed back on toremifene. \par  \par  DEXA shows osteopenia- patient denied Fosamax \par  \par  Patient had a tumor removed from her chest cavity at 15. Cervical Ca dx 10 weeks into her 1st pregnancy at 34 years old. \par  \par  2013- left breast upper outer IDC; s/p partial mastectomy + sentinel lymph node biopsy Dr. Jo ([place])\par  1.6cm moderately differentiated IDC with 2 negative nodes, triple positive, s/p taxol + herceptin x12 months Dr. Cotto [place] [place]par  She refused XRT and endocrine therapy and eventually developed recurrence in the left breast upper outer quadrant\par  \par  2016 ER/CT positive, HER 2 equivical by IHC, negative by FISH; BRCA positive with 6174 deletion T mutation\par  s/p bilateral nipple sparing mastectomies, repeat left sentinel node biopsy, with direct to implant reconstruction\par  Path: residual foci IDC, largest measuring 1cm and 2 negative nodes, oncotype 22\par  s/p CMF Dr. Carrera ([place]) 12/2016,  patient not willing to take AI or tamoxifen and placed on toremifene \par  s/p implant exchange Dr. Durán \par  \par  2020 s/p RUL resection Dr. Zacarias Frye\par  \par  10/2021 admitted for TIA and vertigo given TPA but found to have brain aneurysm; placed on eliquis\par  \par  2/2022 brain aneurysm clipping at [facility] followed by Dr. 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 No neuropathy  overall feeling well. still notes fatigue

## 2023-12-14 NOTE — ASSESSMENT
[FreeTextEntry1] : #Breast cancer 2013- left breast upper outer IDC; s/p partial mastectomy + sentinel lymph node biopsy Dr. Jo ([place]) 1.6cm moderately differentiated IDC with 2 negative nodes, triple positive, s/p taxol + herceptin x12 months Dr. Cotto ([facility]) She refused XRT and endocrine therapy and eventually developed recurrence in the left breast upper outer quadrant 2016 ER/ND positive, HER 2 equivocal by IHC, negative by FISH; BRCA positive with 6174 deletion T mutation s/p bilateral nipple sparing mastectomies, repeat left sentinel node biopsy, with direct to implant reconstruction Path: residual foci IDC, largest measuring 1cm and 2 negative nodes, oncotype 22 s/p CMF Dr. Carrera ([place]) 12/2016, patient not willing to take AI or tamoxifen and placed on toremifene s/p implant exchange Dr. Durán Patient refuses AI or Tamoxifen. She has been on Tormefine in the past. We have discussed that this is typically not the medication we use in the adjuvant setting and that this is used in the metastatic setting. We have also discussed that this increases her risk of VTE and TIA/stroke. She understands her risk and would still like to proceed. I have advised that she remain on Eliquis while she is on this medication. I do believe that given her history of ER+ breast cancer that she would benefit from Endocrine therapy and she completely understands the risks but wants to proceed with Tormefine. 7/6/23 - vs reviewed. labs drawn in office today. Reviewed PET report and final pathology showed a total of 7 out of 17 positive lower left axillary lymph nodes and there was extranodal extension found in 5 of the positive nodes with lymphovascular invasion present in the accompanying fatty axillary tissue. We discussed the recommendation of chemotherapy with ddAC -->T vs TC. She is concerned about permanent alopecia associated with Taxotere. Reviewed side effect profile of dd AC --> T. Adriamycin 60 mg/m2 and cytoxan 600 mg/m2 q 2 weeks for 4 cycles with onpro for bone marrow convalescence. This will be followed by Taxol 80 mg/m2 weekly x 12 doses. We have reviewed the side effects to include but are not limited to cytopenias with increased risk of infection, sepsis and even death, N/V, Fatigue, alopecia, <1% chance of leukemia in the future and neuropathy. Since AC is high emetogenic risk will give Aloxi 0.25 mg IV, Emend 150 mg IV and decadron 10 mg IV with each cycle. will need follow up with Dr. Zamorano Then would recommend verzenio + tamoxifen as patient has osteoporosis and does not want bisphosphonate therapy or olaparib given BRCA2 mutation. I would consider changing Tormifene to another endocrine therapy either an AI or Fulvestrant. She is concerned about the side effect profile of AIs. She is wanting to try Tamoxifen. If she does this she will need to come off wellbutrin in the future and change over to citalopram, escitalopram or venlafaxine. She will talk to her therapist about this. 7/31/23 - vs reviewed. Had port placed today. Reviewed echo - EF wnl. Acceptable for treatment. CT chest stable lung nodules - no evidence of recurrent malignancy. Reviewed side effect profile again of chemo. All questions have been addressed and answered to patients satisfaction. She is seeing the dentist for evaluation and will call when ready for chemo. 8/31/23 - vs and labs reviewed. labs drawn in office today. wbc, hgb and plts wnl. slightly elevated ALKP - monitor otherwise kidney function and liver function wnl. proceed with cycle 2 of ddAC with onpro. 9/14/23 - vs reviewed. labs drawn in office today. wbc 12.77, hgb 12.9, plts 174. elevated ALKP 152 - likely due to chemo. electrolytes wnl, otherwise kidney function and liver function wnl. 9/28/23 - vs reviewed. labs drawn in office today. wbc 16 - likely due to onpro. monitor. hgb 11.7 - monitor and plts wnl. K low - she will increase K rich foods. ALKP 170 - likely due to chemo. monitor. proceed with cycle 4 of ddAC with onpro 10/12/23 vs reviewed. labs drawn in office today, wbc 18.9, hgb wnl, plts wnl. proceed with taxol. 10/19/23 - vs reviewed. labs drawn in office today. wbc wnl, plts wnl. hgb 10.5 - minitor - likely due to chemo. K 3.3 - will replace otherwise liverfunction and kidney function ok to proceed with treatment. proceed with taxol 10/26/23 - vs reviewed. labs drawn in office today. wbc and plts wnl. hgb 10.3 - monitor - due to chemo. electrolytes, kidney function and liver function wnl. ok to proceed with treatment. proceed with taxol #3. 11/2/23 - vs reviewed. labs drawn in office today. wbc 4.98, hgb 10.2, plt 300. elevated ALT 48 - monitor. ok to proceed with treatment. proceed with taxol #4. She does not want Radiation but has agreed to have a conversation with Dr. Dawson, Radiation oncology. She is concerned about the side effect profile of AIs. She is wanting to try Tamoxifen after chemo. If she does this she will need to come off wellbutrin in the future and change over to citalopram, escitalopram or venlafaxine. She will talk to Dr. Pierce. She is concerned about the side effect profile of verzenio or olaparib affecting her quality of life. She will think about these medications and read more about them. 11/9/23 - vs and labs reviewed. labs drawn in office today. wbc 4.1, hgb 11.1, plts 212 - ok to proceed with taxol #5. will have her see Dr. Dawson as she gets closer to cycle 12. she is willing to take verzenio after chemo however she wants to start at 100 mg BID. I am ok with this as I have previously seen patients with extensive side effects at 150 mg BID and I believe this patient would stop treatment if she has significant side effects. Again she is not willing to try an AI. She will start on Tamoxife as well. 11/16/23 vs and labs reviewed. labs drawn in office today. wbc 4.35, hgb 10.8, plts 198. ok to proceed with cycle 6. elevation in ALT/AST likely due to taxol - will monitor. She is not coming next Thursday due to Thanksgiving. She will proceed the next Thursday - I would like to see if ALT/AST downtrends with the week off. If no improvement will image liver. 11/30/23 - vs and labs reviewed. labs drawn in office today. wbc 4.95, hgb 12, plts 214. ALT down trending, AST wnl. Ca+, electrolytes and kidney function wnl. proceed with cycle 7 today. I will refer her to RT to schedule an appt to discuss RT with Dr. Dawson 12/7/23 - vs and labs reviewed. labs drawn in office today. wbc 4.45, hgb 11.7, plts wnl, CMP reviewed ALT 39 -likely due to chemo - monitor. proceed with cycle 8 today. She is discussing with Dr. Pierce about weaning off wellbutrin and Dr. Pierce is looking into the literature regarding tamoxifen and wellbutrin 12/14/23 - vs and labs reviewed. labs drawn in office today. wbc 4.45, hgb 11.7, plts wnl, CMP reviewed ALT 39 -likely due to chemo - monitor. proceed with cycle 9 today. Reached out to Radiation for them to reach out to patient to discuss role of RT.   #BRCA 2 PET from 5/23 showed no evidence of pancreatic uptake. will need MRI pancreas 5/24 Breast MRI -MRI breast 10/22 - reviewed- no evidence of malignancy. Derm - had yearly skin eval May 2022 - advised to follow up again given growth on eyebrow for evaluation. repeat derm eval yearly S/p mastectomy and TAHBSO  #lung cancer with recurrence s/p SBRT  # TIA likely 2/2 afib and aneurysm on eliquis  #osteoporosis taking ca++ and vit D does not want bisphosphonates (oral, SQ or IV) but continue to discuss this  #anxiety on lorazepam  Follow in 1 weeks with cbc with diff, cmp, LDH, ESR/CRP, mag, iron, ferritin, b12, folate, magnesium

## 2023-12-20 ENCOUNTER — RESULT REVIEW (OUTPATIENT)
Age: 63
End: 2023-12-20

## 2023-12-20 ENCOUNTER — APPOINTMENT (OUTPATIENT)
Dept: HEMATOLOGY ONCOLOGY | Facility: CLINIC | Age: 63
End: 2023-12-20

## 2023-12-28 ENCOUNTER — RESULT REVIEW (OUTPATIENT)
Age: 63
End: 2023-12-28

## 2023-12-28 ENCOUNTER — APPOINTMENT (OUTPATIENT)
Dept: HEMATOLOGY ONCOLOGY | Facility: CLINIC | Age: 63
End: 2023-12-28
Payer: COMMERCIAL

## 2023-12-28 VITALS
TEMPERATURE: 97.4 F | OXYGEN SATURATION: 99 % | HEIGHT: 64 IN | SYSTOLIC BLOOD PRESSURE: 121 MMHG | WEIGHT: 146 LBS | RESPIRATION RATE: 16 BRPM | HEART RATE: 79 BPM | BODY MASS INDEX: 24.92 KG/M2 | DIASTOLIC BLOOD PRESSURE: 62 MMHG

## 2023-12-28 PROCEDURE — 99214 OFFICE O/P EST MOD 30 MIN: CPT

## 2023-12-28 NOTE — HISTORY OF PRESENT ILLNESS
[de-identified] : Ms. Roman is a 61 year old woman who presents for endocrine therapy consultation. Patient is on toremifene because she takes wellbutrin which is contraindicated with tamoxifen. Patient is seeing us today because her current oncologist does not want her on toremifene and wants to place her on an AI but patient is refusing. Patient states that last week she experienced her first migraine since her brain surgery and states that she left a note on her night stand for her daughter because the patient did not believe she would wake up. Patient states that she has PTSD due to her medical history. Patients goal is to be treated by Dr. Gregory Because she is passionate about her quality of life over her quantity. She wishes to be viewed as a whole person and is advocating to be placed back on toremifene. \par  \par  DEXA shows osteopenia- patient denied Fosamax \par  \par  Patient had a tumor removed from her chest cavity at 15. Cervical Ca dx 10 weeks into her 1st pregnancy at 34 years old. \par  \par  2013- left breast upper outer IDC; s/p partial mastectomy + sentinel lymph node biopsy Dr. Jo ([place])\par  1.6cm moderately differentiated IDC with 2 negative nodes, triple positive, s/p taxol + herceptin x12 months Dr. Cotto ([facility]par  She refused XRT and endocrine therapy and eventually developed recurrence in the left breast upper outer quadrant\par  \par  2016 ER/ID positive, HER 2 equivical by IHC, negative by FISH; BRCA positive with 6174 deletion T mutation\par  s/p bilateral nipple sparing mastectomies, repeat left sentinel node biopsy, with direct to implant reconstruction\par  Path: residual foci IDC, largest measuring 1cm and 2 negative nodes, oncotype 22\par  s/p CMF Dr. Carrera ([place]) 12/2016,  patient not willing to take AI or tamoxifen and placed on toremifene \par  s/p implant exchange Dr. Durán \par  \par  2020 s/p RUL resection Dr. Zacarias Frye\par  \par  10/2021 admitted for TIA and vertigo given TPA but found to have brain aneurysm; placed on eliquis\par  \par  2/2022 brain aneurysm clipping at [facility] followed by Dr. 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 Had some swelling in lower extremities and feeling of feet on fire but resolved after getting off feet overall feeling well. still notes fatigue Dermal Autograft Text: The defect edges were debeveled with a #15 scalpel blade.  Given the location of the defect, shape of the defect and the proximity to free margins a dermal autograft was deemed most appropriate.  Using a sterile surgical marker, the primary defect shape was transferred to the donor site. The area thus outlined was incised deep to adipose tissue with a #15 scalpel blade.  The harvested graft was then trimmed of adipose and epidermal tissue until only dermis was left.  The skin graft was then placed in the primary defect and oriented appropriately.

## 2023-12-28 NOTE — ASSESSMENT
[FreeTextEntry1] : #Breast cancer 2013- left breast upper outer IDC; s/p partial mastectomy + sentinel lymph node biopsy Dr. Jo ([place]) 1.6cm moderately differentiated IDC with 2 negative nodes, triple positive, s/p taxol + herceptin x12 months Dr. Cotto ([facility]) She refused XRT and endocrine therapy and eventually developed recurrence in the left breast upper outer quadrant 2016 ER/RI positive, HER 2 equivocal by IHC, negative by FISH; BRCA positive with 6174 deletion T mutation s/p bilateral nipple sparing mastectomies, repeat left sentinel node biopsy, with direct to implant reconstruction Path: residual foci IDC, largest measuring 1cm and 2 negative nodes, oncotype 22 s/p CMF Dr. Carrera ([place]) 12/2016, patient not willing to take AI or tamoxifen and placed on toremifene s/p implant exchange Dr. Durán Patient refuses AI or Tamoxifen. She has been on Tormefine in the past. We have discussed that this is typically not the medication we use in the adjuvant setting and that this is used in the metastatic setting. We have also discussed that this increases her risk of VTE and TIA/stroke. She understands her risk and would still like to proceed. I have advised that she remain on Eliquis while she is on this medication. I do believe that given her history of ER+ breast cancer that she would benefit from Endocrine therapy and she completely understands the risks but wants to proceed with Tormefine. 7/6/23 - vs reviewed. labs drawn in office today. Reviewed PET report and final pathology showed a total of 7 out of 17 positive lower left axillary lymph nodes and there was extranodal extension found in 5 of the positive nodes with lymphovascular invasion present in the accompanying fatty axillary tissue. We discussed the recommendation of chemotherapy with ddAC -->T vs TC. She is concerned about permanent alopecia associated with Taxotere. Reviewed side effect profile of dd AC --> T. Adriamycin 60 mg/m2 and cytoxan 600 mg/m2 q 2 weeks for 4 cycles with onpro for bone marrow convalescence. This will be followed by Taxol 80 mg/m2 weekly x 12 doses. We have reviewed the side effects to include but are not limited to cytopenias with increased risk of infection, sepsis and even death, N/V, Fatigue, alopecia, <1% chance of leukemia in the future and neuropathy. Since AC is high emetogenic risk will give Aloxi 0.25 mg IV, Emend 150 mg IV and decadron 10 mg IV with each cycle. will need follow up with Dr. Zamorano Then would recommend verzenio + tamoxifen as patient has osteoporosis and does not want bisphosphonate therapy or olaparib given BRCA2 mutation. I would consider changing Tormifene to another endocrine therapy either an AI or Fulvestrant. She is concerned about the side effect profile of AIs. She is wanting to try Tamoxifen. If she does this she will need to come off wellbutrin in the future and change over to citalopram, escitalopram or venlafaxine. She will talk to her therapist about this. 7/31/23 - vs reviewed. Had port placed today. Reviewed echo - EF wnl. Acceptable for treatment. CT chest stable lung nodules - no evidence of recurrent malignancy. Reviewed side effect profile again of chemo. All questions have been addressed and answered to patients satisfaction. She is seeing the dentist for evaluation and will call when ready for chemo. 8/31/23 - vs and labs reviewed. labs drawn in office today. wbc, hgb and plts wnl. slightly elevated ALKP - monitor otherwise kidney function and liver function wnl. proceed with cycle 2 of ddAC with onpro. 9/14/23 - vs reviewed. labs drawn in office today. wbc 12.77, hgb 12.9, plts 174. elevated ALKP 152 - likely due to chemo. electrolytes wnl, otherwise kidney function and liver function wnl. 9/28/23 - vs reviewed. labs drawn in office today. wbc 16 - likely due to onpro. monitor. hgb 11.7 - monitor and plts wnl. K low - she will increase K rich foods. ALKP 170 - likely due to chemo. monitor. proceed with cycle 4 of ddAC with onpro 10/12/23 vs reviewed. labs drawn in office today, wbc 18.9, hgb wnl, plts wnl. proceed with taxol. 10/19/23 - vs reviewed. labs drawn in office today. wbc wnl, plts wnl. hgb 10.5 - minitor - likely due to chemo. K 3.3 - will replace otherwise liverfunction and kidney function ok to proceed with treatment. proceed with taxol 10/26/23 - vs reviewed. labs drawn in office today. wbc and plts wnl. hgb 10.3 - monitor - due to chemo. electrolytes, kidney function and liver function wnl. ok to proceed with treatment. proceed with taxol #3. 11/2/23 - vs reviewed. labs drawn in office today. wbc 4.98, hgb 10.2, plt 300. elevated ALT 48 - monitor. ok to proceed with treatment. proceed with taxol #4. She does not want Radiation but has agreed to have a conversation with Dr. Dawson, Radiation oncology. She is concerned about the side effect profile of AIs. She is wanting to try Tamoxifen after chemo. If she does this she will need to come off wellbutrin in the future and change over to citalopram, escitalopram or venlafaxine. She will talk to Dr. Pierce. She is concerned about the side effect profile of verzenio or olaparib affecting her quality of life. She will think about these medications and read more about them. 11/9/23 - vs and labs reviewed. labs drawn in office today. wbc 4.1, hgb 11.1, plts 212 - ok to proceed with taxol #5. will have her see Dr. Dawson as she gets closer to cycle 12. she is willing to take verzenio after chemo however she wants to start at 100 mg BID. I am ok with this as I have previously seen patients with extensive side effects at 150 mg BID and I believe this patient would stop treatment if she has significant side effects. Again she is not willing to try an AI. She will start on Tamoxife as well. 11/16/23 vs and labs reviewed. labs drawn in office today. wbc 4.35, hgb 10.8, plts 198. ok to proceed with cycle 6. elevation in ALT/AST likely due to taxol - will monitor. She is not coming next Thursday due to Thanksgiving. She will proceed the next Thursday - I would like to see if ALT/AST downtrends with the week off. If no improvement will image liver. 11/30/23 - vs and labs reviewed. labs drawn in office today. wbc 4.95, hgb 12, plts 214. ALT down trending, AST wnl. Ca+, electrolytes and kidney function wnl. proceed with cycle 7 today. I will refer her to RT to schedule an appt to discuss RT with Dr. Dawson 12/7/23 - vs and labs reviewed. labs drawn in office today. wbc 4.45, hgb 11.7, plts wnl, CMP reviewed ALT 39 -likely due to chemo - monitor. proceed with cycle 8 today. She is discussing with Dr. Pierce about weaning off wellbutrin and Dr. Pierce is looking into the literature regarding tamoxifen and wellbutrin 12/14/23 - vs and labs reviewed. labs drawn in office today. wbc 4.45, hgb 11.7, plts wnl, CMP reviewed ALT 39 -likely due to chemo - monitor. proceed with cycle 9 today. Reached out to Radiation for them to reach out to patient to discuss role of RT.  12/28/23 0 vs and labs reviewed. labs drawn in office today. wbc 4.47, hgb 12.8, plts wnl, CMP reviewed ALT 35 -likely due to chemo - monitor. proceed with cycle 11 today. Last imaging in regards to her cancers was 7/23. recommend PET CT 1/24 to make sure there is no distant disease prior to starting verzenio and tamoxifen. She will also have the discussion with Dr. Dawson   #BRCA 2 PET from 5/23 showed no evidence of pancreatic uptake. will need MRI pancreas 5/24 Breast MRI -MRI breast 10/22 - reviewed- no evidence of malignancy. Derm - had yearly skin eval May 2022 - advised to follow up again given growth on eyebrow for evaluation. repeat derm eval yearly S/p mastectomy and TAHBSO  #lung cancer with recurrence s/p SBRT recommend PET CT 1/24 to make sure there is no recurrence or distant disease  # TIA likely 2/2 afib and aneurysm on eliquis  #osteoporosis taking ca++ and vit D does not want bisphosphonates (oral, SQ or IV) but continue to discuss this  #anxiety on lorazepam  Follow in 1 weeks with cbc with diff, cmp, LDH, ESR/CRP, mag, iron, ferritin, b12, folate, magnesium

## 2023-12-28 NOTE — CONSULT LETTER
[Dear  ___] : Dear  [unfilled], [Consult Letter:] : I had the pleasure of evaluating your patient, [unfilled]. [Please see my note below.] : Please see my note below. [Consult Closing:] : Thank you very much for allowing me to participate in the care of this patient.  If you have any questions, please do not hesitate to contact me. [Sincerely,] : Sincerely, [FreeTextEntry3] : Heidi Gregory DO, FACJOSELITO, FACP\par  Medical Oncology and Hematology\par  [facility] [place]\par

## 2024-01-04 ENCOUNTER — APPOINTMENT (OUTPATIENT)
Dept: HEMATOLOGY ONCOLOGY | Facility: CLINIC | Age: 64
End: 2024-01-04
Payer: COMMERCIAL

## 2024-01-04 ENCOUNTER — RESULT REVIEW (OUTPATIENT)
Age: 64
End: 2024-01-04

## 2024-01-04 VITALS
RESPIRATION RATE: 16 BRPM | BODY MASS INDEX: 24.75 KG/M2 | OXYGEN SATURATION: 100 % | WEIGHT: 145 LBS | HEART RATE: 69 BPM | DIASTOLIC BLOOD PRESSURE: 61 MMHG | HEIGHT: 64 IN | TEMPERATURE: 97.1 F | SYSTOLIC BLOOD PRESSURE: 117 MMHG

## 2024-01-04 DIAGNOSIS — R94.2 ABNORMAL RESULTS OF PULMONARY FUNCTION STUDIES: ICD-10-CM

## 2024-01-04 PROCEDURE — 36415 COLL VENOUS BLD VENIPUNCTURE: CPT

## 2024-01-04 PROCEDURE — 99215 OFFICE O/P EST HI 40 MIN: CPT | Mod: 25

## 2024-01-04 NOTE — CONSULT LETTER
[Dear  ___] : Dear  [unfilled], [Consult Letter:] : I had the pleasure of evaluating your patient, [unfilled]. [Please see my note below.] : Please see my note below. [Consult Closing:] : Thank you very much for allowing me to participate in the care of this patient.  If you have any questions, please do not hesitate to contact me. [Sincerely,] : Sincerely, [FreeTextEntry3] : Heidi Gregory DO, FACJOSELITO, FACP\par  Medical Oncology and Hematology\par  [facility] [place]\par

## 2024-01-04 NOTE — ASSESSMENT
[FreeTextEntry1] : #Breast cancer 2013- left breast upper outer IDC; s/p partial mastectomy + sentinel lymph node biopsy Dr. Jo ([place]) 1.6cm moderately differentiated IDC with 2 negative nodes, triple positive, s/p taxol + herceptin x12 months Dr. Cotto ([facility] [place]) She refused XRT and endocrine therapy and eventually developed recurrence in the left breast upper outer quadrant 2016 ER/VA positive, HER 2 equivocal by IHC, negative by FISH; BRCA positive with 6174 deletion T mutation s/p bilateral nipple sparing mastectomies, repeat left sentinel node biopsy, with direct to implant reconstruction Path: residual foci IDC, largest measuring 1cm and 2 negative nodes, oncotype 22 s/p CMF Dr. Carrera ([place]) 12/2016, patient not willing to take AI or tamoxifen and placed on toremifene s/p implant exchange Dr. Durán Patient refuses AI or Tamoxifen. She has been on Tormefine in the past. We have discussed that this is typically not the medication we use in the adjuvant setting and that this is used in the metastatic setting. We have also discussed that this increases her risk of VTE and TIA/stroke. She understands her risk and would still like to proceed. I have advised that she remain on Eliquis while she is on this medication. I do believe that given her history of ER+ breast cancer that she would benefit from Endocrine therapy and she completely understands the risks but wants to proceed with Tormefine. 7/6/23 - vs reviewed. labs drawn in office today. Reviewed PET report and final pathology showed a total of 7 out of 17 positive lower left axillary lymph nodes and there was extranodal extension found in 5 of the positive nodes with lymphovascular invasion present in the accompanying fatty axillary tissue. We discussed the recommendation of chemotherapy with ddAC -->T vs TC. She is concerned about permanent alopecia associated with Taxotere. Reviewed side effect profile of dd AC --> T. Adriamycin 60 mg/m2 and cytoxan 600 mg/m2 q 2 weeks for 4 cycles with onpro for bone marrow convalescence. This will be followed by Taxol 80 mg/m2 weekly x 12 doses. We have reviewed the side effects to include but are not limited to cytopenias with increased risk of infection, sepsis and even death, N/V, Fatigue, alopecia, <1% chance of leukemia in the future and neuropathy. Since AC is high emetogenic risk will give Aloxi 0.25 mg IV, Emend 150 mg IV and decadron 10 mg IV with each cycle. will need follow up with Dr. Zamorano Then would recommend verzenio + tamoxifen as patient has osteoporosis and does not want bisphosphonate therapy or olaparib given BRCA2 mutation. I would consider changing Tormifene to another endocrine therapy either an AI or Fulvestrant. She is concerned about the side effect profile of AIs. She is wanting to try Tamoxifen. If she does this she will need to come off wellbutrin in the future and change over to citalopram, escitalopram or venlafaxine. She will talk to her therapist about this. 7/31/23 - vs reviewed. Had port placed today. Reviewed echo - EF wnl. Acceptable for treatment. CT chest stable lung nodules - no evidence of recurrent malignancy. Reviewed side effect profile again of chemo. All questions have been addressed and answered to patients satisfaction. She is seeing the dentist for evaluation and will call when ready for chemo. 8/31/23 - vs and labs reviewed. labs drawn in office today. wbc, hgb and plts wnl. slightly elevated ALKP - monitor otherwise kidney function and liver function wnl. proceed with cycle 2 of ddAC with onpro. 9/14/23 - vs reviewed. labs drawn in office today. wbc 12.77, hgb 12.9, plts 174. elevated ALKP 152 - likely due to chemo. electrolytes wnl, otherwise kidney function and liver function wnl. 9/28/23 - vs reviewed. labs drawn in office today. wbc 16 - likely due to onpro. monitor. hgb 11.7 - monitor and plts wnl. K low - she will increase K rich foods. ALKP 170 - likely due to chemo. monitor. proceed with cycle 4 of ddAC with onpro 10/12/23 vs reviewed. labs drawn in office today, wbc 18.9, hgb wnl, plts wnl. proceed with taxol. 10/19/23 - vs reviewed. labs drawn in office today. wbc wnl, plts wnl. hgb 10.5 - minitor - likely due to chemo. K 3.3 - will replace otherwise liverfunction and kidney function ok to proceed with treatment. proceed with taxol 10/26/23 - vs reviewed. labs drawn in office today. wbc and plts wnl. hgb 10.3 - monitor - due to chemo. electrolytes, kidney function and liver function wnl. ok to proceed with treatment. proceed with taxol #3. 11/2/23 - vs reviewed. labs drawn in office today. wbc 4.98, hgb 10.2, plt 300. elevated ALT 48 - monitor. ok to proceed with treatment. proceed with taxol #4. She does not want Radiation but has agreed to have a conversation with Dr. Dawson, Radiation oncology. She is concerned about the side effect profile of AIs. She is wanting to try Tamoxifen after chemo. If she does this she will need to come off wellbutrin in the future and change over to citalopram, escitalopram or venlafaxine. She will talk to Dr. Pierce. She is concerned about the side effect profile of verzenio or olaparib affecting her quality of life. She will think about these medications and read more about them. 11/9/23 - vs and labs reviewed. labs drawn in office today. wbc 4.1, hgb 11.1, plts 212 - ok to proceed with taxol #5. will have her see Dr. Dawson as she gets closer to cycle 12. she is willing to take verzenio after chemo however she wants to start at 100 mg BID. I am ok with this as I have previously seen patients with extensive side effects at 150 mg BID and I believe this patient would stop treatment if she has significant side effects. Again she is not willing to try an AI. She will start on Tamoxife as well. 11/16/23 vs and labs reviewed. labs drawn in office today. wbc 4.35, hgb 10.8, plts 198. ok to proceed with cycle 6. elevation in ALT/AST likely due to taxol - will monitor. She is not coming next Thursday due to Thanksgiving. She will proceed the next Thursday - I would like to see if ALT/AST downtrends with the week off. If no improvement will image liver. 11/30/23 - vs and labs reviewed. labs drawn in office today. wbc 4.95, hgb 12, plts 214. ALT down trending, AST wnl. Ca+, electrolytes and kidney function wnl. proceed with cycle 7 today. I will refer her to RT to schedule an appt to discuss RT with Dr. Dawson 12/7/23 - vs and labs reviewed. labs drawn in office today. wbc 4.45, hgb 11.7, plts wnl, CMP reviewed ALT 39 -likely due to chemo - monitor. proceed with cycle 8 today. She is discussing with Dr. Pierce about weaning off wellbutrin and Dr. Pierce is looking into the literature regarding tamoxifen and wellbutrin 12/14/23 - vs and labs reviewed. labs drawn in office today. wbc 4.45, hgb 11.7, plts wnl, CMP reviewed ALT 39 -likely due to chemo - monitor. proceed with cycle 9 today. Reached out to Radiation for them to reach out to patient to discuss role of RT.  12/28/23 0 vs and labs reviewed. labs drawn in office today. wbc 4.47, hgb 12.8, plts wnl, CMP reviewed ALT 35 -likely due to chemo - monitor. proceed with cycle 11 today. Last imaging in regards to her cancers was 7/23. recommend PET CT 1/24 to make sure there is no distant disease prior to starting verzenio and tamoxifen. She will also have the discussion with Dr. Dawson  1/4/24 - vs and labs reviewed. labs drawn in office today. completing taxol 12 cycles. Plan for PET CT. Did not have eval with radiation here. Recommend verzenio and tamoxifen   #BRCA 2 PET from 5/23 showed no evidence of pancreatic uptake. will need MRI pancreas 5/24 Breast MRI -MRI breast 10/22 - reviewed- no evidence of malignancy. Derm - had yearly skin eval May 2022 - advised to follow up again given growth on eyebrow for evaluation. repeat derm eval yearly S/p mastectomy and TAHBSO  #lung cancer with recurrence s/p SBRT recommend PET CT 1/24 to make sure there is no recurrence or distant disease  # TIA likely 2/2 afib and aneurysm on eliquis  #osteoporosis taking ca++ and vit D does not want bisphosphonates (oral, SQ or IV) but continue to discuss this  #anxiety on lorazepam  Follow in 2 weeks with cbc with diff, cmp, LDH, ESR/CRP, mag, iron, ferritin, b12, folate, magnesium and to review PET CT

## 2024-01-04 NOTE — HISTORY OF PRESENT ILLNESS
[de-identified] : Ms. Roman is a 61 year old woman who presents for endocrine therapy consultation. Patient is on toremifene because she takes wellbutrin which is contraindicated with tamoxifen. Patient is seeing us today because her current oncologist does not want her on toremifene and wants to place her on an AI but patient is refusing. Patient states that last week she experienced her first migraine since her brain surgery and states that she left a note on her night stand for her daughter because the patient did not believe she would wake up. Patient states that she has PTSD due to her medical history. Patients goal is to be treated by Dr. Gregory Because she is passionate about her quality of life over her quantity. She wishes to be viewed as a whole person and is advocating to be placed back on toremifene. \par  \par  DEXA shows osteopenia- patient denied Fosamax \par  \par  Patient had a tumor removed from her chest cavity at 15. Cervical Ca dx 10 weeks into her 1st pregnancy at 34 years old. \par  \par  2013- left breast upper outer IDC; s/p partial mastectomy + sentinel lymph node biopsy Dr. Jo ([employer])\par  1.6cm moderately differentiated IDC with 2 negative nodes, triple positive, s/p taxol + herceptin x12 months Dr. Cotto ([facility] [place]par  She refused XRT and endocrine therapy and eventually developed recurrence in the left breast upper outer quadrant\par  \par  2016 ER/CT positive, HER 2 equivical by IHC, negative by FISH; BRCA positive with 6174 deletion T mutation\par  s/p bilateral nipple sparing mastectomies, repeat left sentinel node biopsy, with direct to implant reconstruction\par  Path: residual foci IDC, largest measuring 1cm and 2 negative nodes, oncotype 22\par  s/p CMF Dr. Carrera ([place]) 12/2016,  patient not willing to take AI or tamoxifen and placed on toremifene \par  s/p implant exchange Dr. Durán \par  \par  2020 s/p RUL resection Dr. Zacarias Frye\par  \par  10/2021 admitted for TIA and vertigo given TPA but found to have brain aneurysm; placed on eliquis\par  \par  2/2022 brain aneurysm clipping at [facility] followed by Dr. 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  tolerated well pulled muscle/ intercoastal nerve

## 2024-01-08 ENCOUNTER — APPOINTMENT (OUTPATIENT)
Dept: RADIATION ONCOLOGY | Facility: CLINIC | Age: 64
End: 2024-01-08
Payer: COMMERCIAL

## 2024-01-08 VITALS — RESPIRATION RATE: 16 BRPM | HEART RATE: 74 BPM | SYSTOLIC BLOOD PRESSURE: 114 MMHG | DIASTOLIC BLOOD PRESSURE: 54 MMHG

## 2024-01-08 PROCEDURE — 99205 OFFICE O/P NEW HI 60 MIN: CPT

## 2024-01-17 ENCOUNTER — APPOINTMENT (OUTPATIENT)
Dept: HEMATOLOGY ONCOLOGY | Facility: CLINIC | Age: 64
End: 2024-01-17

## 2024-01-22 ENCOUNTER — NON-APPOINTMENT (OUTPATIENT)
Age: 64
End: 2024-01-22

## 2024-01-24 ENCOUNTER — RESULT REVIEW (OUTPATIENT)
Age: 64
End: 2024-01-24

## 2024-01-24 ENCOUNTER — NON-APPOINTMENT (OUTPATIENT)
Age: 64
End: 2024-01-24

## 2024-01-31 ENCOUNTER — RESULT REVIEW (OUTPATIENT)
Age: 64
End: 2024-01-31

## 2024-01-31 ENCOUNTER — APPOINTMENT (OUTPATIENT)
Dept: HEMATOLOGY ONCOLOGY | Facility: CLINIC | Age: 64
End: 2024-01-31
Payer: COMMERCIAL

## 2024-01-31 VITALS
HEIGHT: 64 IN | SYSTOLIC BLOOD PRESSURE: 104 MMHG | HEART RATE: 80 BPM | RESPIRATION RATE: 16 BRPM | OXYGEN SATURATION: 99 % | WEIGHT: 145 LBS | DIASTOLIC BLOOD PRESSURE: 54 MMHG | TEMPERATURE: 97.8 F | BODY MASS INDEX: 24.75 KG/M2

## 2024-01-31 DIAGNOSIS — A49.9 URINARY TRACT INFECTION, SITE NOT SPECIFIED: ICD-10-CM

## 2024-01-31 DIAGNOSIS — N39.0 URINARY TRACT INFECTION, SITE NOT SPECIFIED: ICD-10-CM

## 2024-01-31 PROCEDURE — 36415 COLL VENOUS BLD VENIPUNCTURE: CPT

## 2024-01-31 PROCEDURE — 99215 OFFICE O/P EST HI 40 MIN: CPT | Mod: 25

## 2024-01-31 RX ORDER — TOREMIFENE CITRATE 60 MG/1
60 TABLET ORAL
Refills: 0 | Status: COMPLETED | COMMUNITY
End: 2024-01-31

## 2024-01-31 RX ORDER — CALCIUM CARBONATE/VITAMIN D3 600 MG-10
TABLET ORAL
Refills: 0 | Status: ACTIVE | COMMUNITY

## 2024-01-31 RX ORDER — POTASSIUM CHLORIDE 1500 MG/1
20 TABLET, FILM COATED, EXTENDED RELEASE ORAL
Qty: 1 | Refills: 0 | Status: COMPLETED | COMMUNITY
Start: 2023-10-19 | End: 2024-01-31

## 2024-01-31 RX ORDER — MAGNESIUM OXIDE/MAG AA CHELATE 300 MG
CAPSULE ORAL
Refills: 0 | Status: ACTIVE | COMMUNITY

## 2024-01-31 RX ORDER — THIAMINE HCL 100 MG
TABLET ORAL
Refills: 0 | Status: ACTIVE | COMMUNITY

## 2024-01-31 RX ORDER — OLANZAPINE 10 MG/1
10 TABLET, ORALLY DISINTEGRATING ORAL DAILY
Qty: 30 | Refills: 1 | Status: COMPLETED | COMMUNITY
Start: 2023-07-20 | End: 2024-01-31

## 2024-01-31 RX ORDER — BUPROPION HYDROCHLORIDE 150 MG/1
150 TABLET, FILM COATED ORAL
Refills: 0 | Status: ACTIVE | COMMUNITY

## 2024-01-31 RX ORDER — OXYCODONE 5 MG/1
5 TABLET ORAL
Qty: 20 | Refills: 0 | Status: COMPLETED | COMMUNITY
Start: 2023-06-20 | End: 2024-01-31

## 2024-01-31 RX ORDER — BUPROPION HYDROCHLORIDE 150 MG/1
150 TABLET, EXTENDED RELEASE ORAL
Refills: 0 | Status: COMPLETED | COMMUNITY
End: 2024-01-31

## 2024-01-31 RX ORDER — UBIDECARENONE/VIT E ACET 100MG-5
CAPSULE ORAL
Refills: 0 | Status: ACTIVE | COMMUNITY

## 2024-01-31 RX ORDER — OXYCODONE 5 MG/1
5 TABLET ORAL
Qty: 15 | Refills: 0 | Status: COMPLETED | COMMUNITY
Start: 2023-06-07 | End: 2024-01-31

## 2024-01-31 RX ORDER — LACTOBACILLUS ACIDOPHILUS/PECT 30 MG-20MG
TABLET ORAL
Refills: 0 | Status: ACTIVE | COMMUNITY

## 2024-01-31 RX ORDER — MULTIVIT-MIN/IRON/FOLIC ACID/K 18-600-40
CAPSULE ORAL
Refills: 0 | Status: ACTIVE | COMMUNITY

## 2024-01-31 RX ORDER — PNV NO.95/FERROUS FUM/FOLIC AC 28MG-0.8MG
TABLET ORAL
Refills: 0 | Status: ACTIVE | COMMUNITY

## 2024-01-31 RX ORDER — CHROMIUM 200 MCG
TABLET ORAL
Refills: 0 | Status: ACTIVE | COMMUNITY

## 2024-01-31 RX ORDER — ACETAMINOPHEN 500 MG
TABLET ORAL
Refills: 0 | Status: ACTIVE | COMMUNITY

## 2024-01-31 RX ORDER — TOREMIFENE CITRATE 60 MG/1
60 TABLET ORAL DAILY
Qty: 90 | Refills: 3 | Status: COMPLETED | COMMUNITY
Start: 2022-08-31 | End: 2024-01-31

## 2024-01-31 RX ORDER — ZINC SULFATE 50(220)MG
CAPSULE ORAL
Refills: 0 | Status: ACTIVE | COMMUNITY

## 2024-02-01 NOTE — REVIEW OF SYSTEMS
[Joint Pain] : joint pain [Joint Stiffness] : joint stiffness [Skin Rash] : skin rash [Anxiety] : anxiety [FreeTextEntry8] : burning on urination since the weekend [FreeTextEntry9] : stiffness and pain in bilateral knees for the last few weeks [de-identified] : some petechiae since last week [de-identified] : PTSD

## 2024-02-01 NOTE — ASSESSMENT
[FreeTextEntry1] : #Breast cancer 2013- left breast upper outer IDC; s/p partial mastectomy + sentinel lymph node biopsy Dr. Jo ([place]) 1.6cm moderately differentiated IDC with 2 negative nodes, triple positive, s/p taxol + [place]12 months [place] [place] refused XRT and endocrine therapy and eventually developed recurrence in the left breast upper outer quadrant 2016 ER/ID positive, HER 2 equivocal by IHC, negative by FISH; BRCA positive with 6174 deletion T mutation s/p bilateral nipple sparing mastectomies, repeat left sentinel node biopsy, with direct to implant reconstruction Path: residual foci IDC, largest measuring 1cm and 2 negative nodes, oncotype 22 s/p CMF Dr. Carrera ([place]) 12/2016, patient not willing to take AI or tamoxifen and placed on toremifene s/p implant exchange Dr. Durán Patient refuses AI or Tamoxifen. She has been on Tormefine in the past. We have discussed that this is typically not the medication we use in the adjuvant setting and that this is used in the metastatic setting. We have also discussed that this increases her risk of VTE and TIA/stroke. She understands her risk and would still like to proceed. I have advised that she remain on Eliquis while she is on this medication. I do believe that given her history of ER+ breast cancer that she would benefit from Endocrine therapy and she completely understands the risks but wants to proceed with Tormefine. 7/6/23 - vs reviewed. labs drawn in office today. Reviewed PET report and final pathology showed a total of 7 out of 17 positive lower left axillary lymph nodes and there was extranodal extension found in 5 of the positive nodes with lymphovascular invasion present in the accompanying fatty axillary tissue. We discussed the recommendation of chemotherapy with ddAC -->T vs TC. She is concerned about permanent alopecia associated with Taxotere. Reviewed side effect profile of dd AC --> T. Adriamycin 60 mg/m2 and cytoxan 600 mg/m2 q 2 weeks for 4 cycles with onpro for bone marrow convalescence. This will be followed by Taxol 80 mg/m2 weekly x 12 doses. We have reviewed the side effects to include but are not limited to cytopenias with increased risk of infection, sepsis and even death, N/V, Fatigue, alopecia, <1% chance of leukemia in the future and neuropathy. Since AC is high emetogenic risk will give Aloxi 0.25 mg IV, Emend 150 mg IV and decadron 10 mg IV with each cycle. will need follow up with Dr. Zamorano Then would recommend verzenio + tamoxifen as patient has osteoporosis and does not want bisphosphonate therapy or olaparib given BRCA2 mutation. I would consider changing Tormifene to another endocrine therapy either an AI or Fulvestrant. She is concerned about the side effect profile of AIs. She is wanting to try Tamoxifen. If she does this she will need to come off wellbutrin in the future and change over to citalopram, escitalopram or venlafaxine. She will talk to her therapist about this. 7/31/23 - vs reviewed. Had port placed today. Reviewed echo - EF wnl. Acceptable for treatment. CT chest stable lung nodules - no evidence of recurrent malignancy. Reviewed side effect profile again of chemo. All questions have been addressed and answered to patients satisfaction. She is seeing the dentist for evaluation and will call when ready for chemo. 8/31/23 - vs and labs reviewed. labs drawn in office today. wbc, hgb and plts wnl. slightly elevated ALKP - monitor otherwise kidney function and liver function wnl. proceed with cycle 2 of ddAC with onpro. 9/14/23 - vs reviewed. labs drawn in office today. wbc 12.77, hgb 12.9, plts 174. elevated ALKP 152 - likely due to chemo. electrolytes wnl, otherwise kidney function and liver function wnl. 9/28/23 - vs reviewed. labs drawn in office today. wbc 16 - likely due to onpro. monitor. hgb 11.7 - monitor and plts wnl. K low - she will increase K rich foods. ALKP 170 - likely due to chemo. monitor. proceed with cycle 4 of ddAC with onpro 10/12/23 vs reviewed. labs drawn in office today, wbc 18.9, hgb wnl, plts wnl. proceed with taxol. 10/19/23 - vs reviewed. labs drawn in office today. wbc wnl, plts wnl. hgb 10.5 - minitor - likely due to chemo. K 3.3 - will replace otherwise liverfunction and kidney function ok to proceed with treatment. proceed with taxol 10/26/23 - vs reviewed. labs drawn in office today. wbc and plts wnl. hgb 10.3 - monitor - due to chemo. electrolytes, kidney function and liver function wnl. ok to proceed with treatment. proceed with taxol #3. 11/2/23 - vs reviewed. labs drawn in office today. wbc 4.98, hgb 10.2, plt 300. elevated ALT 48 - monitor. ok to proceed with treatment. proceed with taxol #4. She does not want Radiation but has agreed to have a conversation with Dr. Dawson, Radiation oncology. She is concerned about the side effect profile of AIs. She is wanting to try Tamoxifen after chemo. If she does this she will need to come off wellbutrin in the future and change over to citalopram, escitalopram or venlafaxine. She will talk to Dr. Pierce. She is concerned about the side effect profile of verzenio or olaparib affecting her quality of life. She will think about these medications and read more about them. 11/9/23 - vs and labs reviewed. labs drawn in office today. wbc 4.1, hgb 11.1, plts 212 - ok to proceed with taxol #5. will have her see Dr. Dawson as she gets closer to cycle 12. she is willing to take verzenio after chemo however she wants to start at 100 mg BID. I am ok with this as I have previously seen patients with extensive side effects at 150 mg BID and I believe this patient would stop treatment if she has significant side effects. Again she is not willing to try an AI. She will start on Tamoxife as well. 11/16/23 vs and labs reviewed. labs drawn in office today. wbc 4.35, hgb 10.8, plts 198. ok to proceed with cycle 6. elevation in ALT/AST likely due to taxol - will monitor. She is not coming next Thursday due to Thanksgiving. She will proceed the next Thursday - I would like to see if ALT/AST downtrends with the week off. If no improvement will image liver. 11/30/23 - vs and labs reviewed. labs drawn in office today. wbc 4.95, hgb 12, plts 214. ALT down trending, AST wnl. Ca+, electrolytes and kidney function wnl. proceed with cycle 7 today. I will refer her to RT to schedule an appt to discuss RT with Dr. Dawson 12/7/23 - vs and labs reviewed. labs drawn in office today. wbc 4.45, hgb 11.7, plts wnl, CMP reviewed ALT 39 -likely due to chemo - monitor. proceed with cycle 8 today. She is discussing with Dr. Pierce about weaning off wellbutrin and Dr. Pierce is looking into the literature regarding tamoxifen and wellbutrin 12/14/23 - vs and labs reviewed. labs drawn in office today. wbc 4.45, hgb 11.7, plts wnl, CMP reviewed ALT 39 -likely due to chemo - monitor. proceed with cycle 9 today. Reached out to Radiation for them to reach out to patient to discuss role of RT.  12/28/23 0 vs and labs reviewed. labs drawn in office today. wbc 4.47, hgb 12.8, plts wnl, CMP reviewed ALT 35 -likely due to chemo - monitor. proceed with cycle 11 today. Last imaging in regards to her cancers was 7/23. recommend PET CT 1/24 to make sure there is no distant disease prior to starting verzenio and tamoxifen. She will also have the discussion with Dr. Dawson  1/4/24 - vs and labs reviewed. labs drawn in office today. completing taxol 12 cycles. Plan for PET CT. Did not have eval with radiation here. Recommend verzenio and tamoxifen  1/31/24 - vs and labs reviewed. pet ct reviewed from 1/24/24 -  Since PET/CT from 5/2/2023, the FDG avid left axillary lymph node has been removed. There is no residual lymph node FDG avidity or lymphadenopathy. FDG avid focus in the right eighth rib fracture. Mildly avid right posterior residual upper lobe fibrosis and decreased avidity of right lung apical nodule, likely due to radiation treatment. She continues to not want radiation thus she will start tamoxifen 20 mg daily. She will start verzenio as well however she is concerned about side effects thus would recommend starting slowly. will have her take verzenio 100 mg daily and will slowly increase her to 2 pills daily   #BRCA 2 PET from 5/23 showed no evidence of pancreatic uptake. will need MRI pancreas 5/24 Breast MRI -MRI breast 10/22 - reviewed- no evidence of malignancy. Derm - had yearly skin eval May 2022 - advised to follow up again given growth on eyebrow for evaluation. repeat derm eval yearly S/p mastectomy and TAHBSO  #lung cancer with recurrence s/p SBRT reviewedPET CT 1/24- Mildly avid right posterior residual upper lobe fibrosis and decreased avidity of right lung apical nodule, likely due to radiation treatment  # TIA likely 2/2 afib and aneurysm on eliquis  #osteoporosis taking ca++ and vit D does not want bisphosphonates (oral, SQ or IV) but continue to discuss this  #anxiety on lorazepam  UTI - given macrobid.  may be due to vaginal dryness, For now prefer vit E, replens or coconut oil  Follow in 4 weeks with cbc with diff, cmp, LDH, ESR/CRP, mag, iron, ferritin, b12, folate, magnesium

## 2024-02-01 NOTE — HISTORY OF PRESENT ILLNESS
[de-identified] : Ms. Roman is a 61 year old woman who presents for endocrine therapy consultation. Patient is on toremifene because she takes wellbutrin which is contraindicated with tamoxifen. Patient is seeing us today because her current oncologist does not want her on toremifene and wants to place her on an AI but patient is refusing. Patient states that last week she experienced her first migraine since her brain surgery and states that she left a note on her night stand for her daughter because the patient did not believe she would wake up. Patient states that she has PTSD due to her medical history. Patients goal is to be treated by Dr. Gregory Because she is passionate about her quality of life over her quantity. She wishes to be viewed as a whole person and is advocating to be placed back on toremifene. \par  \par  DEXA shows osteopenia- patient denied Fosamax \par  \par  Patient had a tumor removed from her chest cavity at 15. Cervical Ca dx 10 weeks into her 1st pregnancy at 34 years old. \par  \par  2013- left breast upper outer IDC; s/p partial mastectomy + sentinel lymph node biopsy Dr. Jo ([place])\par  1.6cm moderately differentiated IDC with 2 negative nodes, triple positive, s/p taxol + herceptin x12 months Dr. Cotto ([facility] [place]par  She refused XRT and endocrine therapy and eventually developed recurrence in the left breast upper outer quadrant\par  \par  2016 ER/AK positive, HER 2 equivical by IHC, negative by FISH; BRCA positive with 6174 deletion T mutation\par  s/p bilateral nipple sparing mastectomies, repeat left sentinel node biopsy, with direct to implant reconstruction\par  Path: residual foci IDC, largest measuring 1cm and 2 negative nodes, oncotype 22\par  s/p CMF Dr. Carrera ([place]) 12/2016,  patient not willing to take AI or tamoxifen and placed on toremifene \par  s/p implant exchange Dr. Durán \par  \par  2020 s/p RUL resection Dr. Zacarias Frye\par  \par  10/2021 admitted for TIA and vertigo given TPA but found to have brain aneurysm; placed on eliquis\par  \par  2/2022 brain aneurysm clipping at [facility] followed by Dr. 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

## 2024-02-01 NOTE — CONSULT LETTER
[FreeTextEntry3] : Heidi Gregory DO, FACJOSELITO, FACP\par  Medical Oncology and Hematology\par  [facility]\par

## 2024-03-20 ENCOUNTER — RESULT REVIEW (OUTPATIENT)
Age: 64
End: 2024-03-20

## 2024-03-20 ENCOUNTER — APPOINTMENT (OUTPATIENT)
Dept: HEMATOLOGY ONCOLOGY | Facility: CLINIC | Age: 64
End: 2024-03-20
Payer: COMMERCIAL

## 2024-03-20 VITALS
WEIGHT: 145 LBS | SYSTOLIC BLOOD PRESSURE: 120 MMHG | BODY MASS INDEX: 24.75 KG/M2 | TEMPERATURE: 98.1 F | OXYGEN SATURATION: 99 % | DIASTOLIC BLOOD PRESSURE: 59 MMHG | HEART RATE: 82 BPM | HEIGHT: 64 IN | RESPIRATION RATE: 16 BRPM

## 2024-03-20 PROCEDURE — 36415 COLL VENOUS BLD VENIPUNCTURE: CPT

## 2024-03-20 PROCEDURE — 99214 OFFICE O/P EST MOD 30 MIN: CPT

## 2024-03-20 RX ORDER — ONDANSETRON 4 MG/1
4 TABLET, ORALLY DISINTEGRATING ORAL
Qty: 250 | Refills: 0 | Status: COMPLETED | COMMUNITY
Start: 2023-07-20 | End: 2024-03-20

## 2024-03-20 RX ORDER — NITROFURANTOIN (MONOHYDRATE/MACROCRYSTALS) 25; 75 MG/1; MG/1
100 CAPSULE ORAL
Qty: 14 | Refills: 0 | Status: COMPLETED | COMMUNITY
Start: 2024-01-31 | End: 2024-03-20

## 2024-03-20 RX ORDER — IBUPROFEN 600 MG/1
600 TABLET, FILM COATED ORAL 3 TIMES DAILY
Qty: 30 | Refills: 0 | Status: COMPLETED | COMMUNITY
Start: 2023-06-23 | End: 2024-03-20

## 2024-03-20 NOTE — ASSESSMENT
[FreeTextEntry1] : #Breast cancer 2013- left breast upper outer IDC; s/p partial mastectomy + sentinel lymph node biopsy Dr. Jo ([place]) 1.6cm moderately differentiated IDC with 2 negative nodes, triple positive, s/p taxol + herceptin x12 months Dr. Cotto ([facility] [place] She refused XRT and endocrine therapy and eventually developed recurrence in the left breast upper outer quadrant 2016 ER/IL positive, HER 2 equivocal by IHC, negative by FISH; BRCA positive with 6174 deletion T mutation s/p bilateral nipple sparing mastectomies, repeat left sentinel node biopsy, with direct to implant reconstruction Path: residual foci IDC, largest measuring 1cm and 2 negative nodes, oncotype 22 s/p CMF Dr. Carrera ([place]) 12/2016, patient not willing to take AI or tamoxifen and placed on toremifene s/p implant exchange Dr. Durán Patient refuses AI or Tamoxifen. She has been on Tormefine in the past. We have discussed that this is typically not the medication we use in the adjuvant setting and that this is used in the metastatic setting. We have also discussed that this increases her risk of VTE and TIA/stroke. She understands her risk and would still like to proceed. I have advised that she remain on Eliquis while she is on this medication. I do believe that given her history of ER+ breast cancer that she would benefit from Endocrine therapy and she completely understands the risks but wants to proceed with Tormefine. 7/6/23 - vs reviewed. labs drawn in office today. Reviewed PET report and final pathology showed a total of 7 out of 17 positive lower left axillary lymph nodes and there was extranodal extension found in 5 of the positive nodes with lymphovascular invasion present in the accompanying fatty axillary tissue. We discussed the recommendation of chemotherapy with ddAC -->T vs TC. She is concerned about permanent alopecia associated with Taxotere. Reviewed side effect profile of dd AC --> T. Adriamycin 60 mg/m2 and cytoxan 600 mg/m2 q 2 weeks for 4 cycles with onpro for bone marrow convalescence. This will be followed by Taxol 80 mg/m2 weekly x 12 doses. We have reviewed the side effects to include but are not limited to cytopenias with increased risk of infection, sepsis and even death, N/V, Fatigue, alopecia, <1% chance of leukemia in the future and neuropathy. Since AC is high emetogenic risk will give Aloxi 0.25 mg IV, Emend 150 mg IV and decadron 10 mg IV with each cycle. will need follow up with Dr. Zamorano Then would recommend verzenio + tamoxifen as patient has osteoporosis and does not want bisphosphonate therapy or olaparib given BRCA2 mutation. I would consider changing Tormifene to another endocrine therapy either an AI or Fulvestrant. She is concerned about the side effect profile of AIs. She is wanting to try Tamoxifen. If she does this she will need to come off wellbutrin in the future and change over to citalopram, escitalopram or venlafaxine. She will talk to her therapist about this. 7/31/23 - vs reviewed. Had port placed today. Reviewed echo - EF wnl. Acceptable for treatment. CT chest stable lung nodules - no evidence of recurrent malignancy. Reviewed side effect profile again of chemo. All questions have been addressed and answered to patients satisfaction. She is seeing the dentist for evaluation and will call when ready for chemo. 8/31/23 - vs and labs reviewed. labs drawn in office today. wbc, hgb and plts wnl. slightly elevated ALKP - monitor otherwise kidney function and liver function wnl. proceed with cycle 2 of ddAC with onpro. 9/14/23 - vs reviewed. labs drawn in office today. wbc 12.77, hgb 12.9, plts 174. elevated ALKP 152 - likely due to chemo. electrolytes wnl, otherwise kidney function and liver function wnl. 9/28/23 - vs reviewed. labs drawn in office today. wbc 16 - likely due to onpro. monitor. hgb 11.7 - monitor and plts wnl. K low - she will increase K rich foods. ALKP 170 - likely due to chemo. monitor. proceed with cycle 4 of ddAC with onpro 10/12/23 vs reviewed. labs drawn in office today, wbc 18.9, hgb wnl, plts wnl. proceed with taxol. 10/19/23 - vs reviewed. labs drawn in office today. wbc wnl, plts wnl. hgb 10.5 - minitor - likely due to chemo. K 3.3 - will replace otherwise liverfunction and kidney function ok to proceed with treatment. proceed with taxol 10/26/23 - vs reviewed. labs drawn in office today. wbc and plts wnl. hgb 10.3 - monitor - due to chemo. electrolytes, kidney function and liver function wnl. ok to proceed with treatment. proceed with taxol #3. 11/2/23 - vs reviewed. labs drawn in office today. wbc 4.98, hgb 10.2, plt 300. elevated ALT 48 - monitor. ok to proceed with treatment. proceed with taxol #4. She does not want Radiation but has agreed to have a conversation with Dr. Dawson, Radiation oncology. She is concerned about the side effect profile of AIs. She is wanting to try Tamoxifen after chemo. If she does this she will need to come off wellbutrin in the future and change over to citalopram, escitalopram or venlafaxine. She will talk to Dr. Pierce. She is concerned about the side effect profile of verzenio or olaparib affecting her quality of life. She will think about these medications and read more about them. 11/9/23 - vs and labs reviewed. labs drawn in office today. wbc 4.1, hgb 11.1, plts 212 - ok to proceed with taxol #5. will have her see Dr. Dawson as she gets closer to cycle 12. she is willing to take verzenio after chemo however she wants to start at 100 mg BID. I am ok with this as I have previously seen patients with extensive side effects at 150 mg BID and I believe this patient would stop treatment if she has significant side effects. Again she is not willing to try an AI. She will start on Tamoxife as well. 11/16/23 vs and labs reviewed. labs drawn in office today. wbc 4.35, hgb 10.8, plts 198. ok to proceed with cycle 6. elevation in ALT/AST likely due to taxol - will monitor. She is not coming next Thursday due to Thanksgiving. She will proceed the next Thursday - I would like to see if ALT/AST downtrends with the week off. If no improvement will image liver. 11/30/23 - vs and labs reviewed. labs drawn in office today. wbc 4.95, hgb 12, plts 214. ALT down trending, AST wnl. Ca+, electrolytes and kidney function wnl. proceed with cycle 7 today. I will refer her to RT to schedule an appt to discuss RT with Dr. Dawson 12/7/23 - vs and labs reviewed. labs drawn in office today. wbc 4.45, hgb 11.7, plts wnl, CMP reviewed ALT 39 -likely due to chemo - monitor. proceed with cycle 8 today. She is discussing with Dr. Pierce about weaning off wellbutrin and Dr. Pierce is looking into the literature regarding tamoxifen and wellbutrin 12/14/23 - vs and labs reviewed. labs drawn in office today. wbc 4.45, hgb 11.7, plts wnl, CMP reviewed ALT 39 -likely due to chemo - monitor. proceed with cycle 9 today. Reached out to Radiation for them to reach out to patient to discuss role of RT.  12/28/23 0 vs and labs reviewed. labs drawn in office today. wbc 4.47, hgb 12.8, plts wnl, CMP reviewed ALT 35 -likely due to chemo - monitor. proceed with cycle 11 today. Last imaging in regards to her cancers was 7/23. recommend PET CT 1/24 to make sure there is no distant disease prior to starting verzenio and tamoxifen. She will also have the discussion with Dr. Dawson  1/4/24 - vs and labs reviewed. labs drawn in office today. completing taxol 12 cycles. Plan for PET CT. Did not have eval with radiation here. Recommend verzenio and tamoxifen  1/31/24 - vs and labs reviewed. pet ct reviewed from 1/24/24 -  Since PET/CT from 5/2/2023, the FDG avid left axillary lymph node has been removed. There is no residual lymph node FDG avidity or lymphadenopathy. FDG avid focus in the right eighth rib fracture. Mildly avid right posterior residual upper lobe fibrosis and decreased avidity of right lung apical nodule, likely due to radiation treatment. She continues to not want radiation thus she will start tamoxifen 20 mg daily. She will start verzenio as well however she is concerned about side effects thus would recommend starting slowly. will have her take verzenio 100 mg daily and will slowly increase her to 2 pills daily 3/20/14 - vs and labs reviewed. labs drawn in office today. continue tamoxifen and verzenio 100 mg BID.  #BRCA 2 PET from 1/24 showed no evidence of pancreatic uptake. will need MRI pancreas 5/24 Breast MRI -MRI breast 10/22 - reviewed- no evidence of malignancy. Derm - follow up with Dr. Acosta or Dr. Ruffin S/p mastectomy and TAHBSO  #lung cancer with recurrence s/p SBRT reviewed PET CT 1/24- Mildly avid right posterior residual upper lobe fibrosis and decreased avidity of right lung apical nodule, likely due to radiation treatment CXR 4/2024 and then in July 2024 CT chest  # TIA likely 2/2 afib and aneurysm on eliquis  #osteoporosis taking ca++ and vit D does not want bisphosphonates (oral, SQ or IV) but continue to discuss this  #anxiety on lorazepam - she takes this during the day and then she takes xanax at night to sleep because it is more effective   Follow in 4 weeks with cbc with diff, cmp, LDH, ESR/CRP, mag, iron, ferritin, b12, folate, magnesium

## 2024-03-20 NOTE — HISTORY OF PRESENT ILLNESS
[de-identified] : Ms. Roman is a 61 year old woman who presents for endocrine therapy consultation. Patient is on toremifene because she takes wellbutrin which is contraindicated with tamoxifen. Patient is seeing us today because her current oncologist does not want her on toremifene and wants to place her on an AI but patient is refusing. Patient states that last week she experienced her first migraine since her brain surgery and states that she left a note on her night stand for her daughter because the patient did not believe she would wake up. Patient states that she has PTSD due to her medical history. Patients goal is to be treated by Dr. Gregory Because she is passionate about her quality of life over her quantity. She wishes to be viewed as a whole person and is advocating to be placed back on toremifene. \par  \par  DEXA shows osteopenia- patient denied Fosamax \par  \par  Patient had a tumor removed from her chest cavity at 15. Cervical Ca dx 10 weeks into her 1st pregnancy at 34 years old. \par  \par  2013- left breast upper outer IDC; s/p partial mastectomy + sentinel lymph node biopsy Dr. Jo ([place])\par  1.6cm moderately differentiated IDC with 2 negative nodes, triple positive, s/p taxol + herceptin x12 months Dr. Cotto [place] [place]par  She refused XRT and endocrine therapy and eventually developed recurrence in the left breast upper outer quadrant\par  \par  2016 ER/AZ positive, HER 2 equivical by IHC, negative by FISH; BRCA positive with 6174 deletion T mutation\par  s/p bilateral nipple sparing mastectomies, repeat left sentinel node biopsy, with direct to implant reconstruction\par  Path: residual foci IDC, largest measuring 1cm and 2 negative nodes, oncotype 22\par  s/p CMF Dr. Carrera ([place]) 12/2016,  patient not willing to take AI or tamoxifen and placed on toremifene \par  s/p implant exchange Dr. Durán \par  \par  2020 s/p RUL resection Dr. Zacarias Frye\par  \par  10/2021 admitted for TIA and vertigo given TPA but found to have brain aneurysm; placed on eliquis\par  \par  2/2022 brain aneurysm clipping at [facility] followed by Dr. 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 Croft next month - has been having pain left breast in upper left quadrant, believes it is lymphedema

## 2024-03-20 NOTE — CONSULT LETTER
[Dear  ___] : Dear  [unfilled], [Consult Letter:] : I had the pleasure of evaluating your patient, [unfilled]. [Please see my note below.] : Please see my note below. [Consult Closing:] : Thank you very much for allowing me to participate in the care of this patient.  If you have any questions, please do not hesitate to contact me. [Sincerely,] : Sincerely, [FreeTextEntry3] : Heidi Gregory DO, FACJOSELITO, FACP\par  Medical Oncology and Hematology\par  [facility]\par

## 2024-03-20 NOTE — REVIEW OF SYSTEMS
[Joint Pain] : joint pain [Joint Stiffness] : joint stiffness [Skin Rash] : skin rash [Anxiety] : anxiety [FreeTextEntry8] : burning on urination since the weekend [de-identified] : some petechiae since last week [Fatigue] : fatigue [Abdominal Pain] : abdominal pain [Negative] : Integumentary [FreeTextEntry4] : dry mouth [FreeTextEntry9] : stiffness and pain in bilateral knees for the last few weeks, aching bones [de-identified] : PTSD

## 2024-04-03 ENCOUNTER — NON-APPOINTMENT (OUTPATIENT)
Age: 64
End: 2024-04-03

## 2024-04-19 NOTE — HISTORY OF PRESENT ILLNESS
[FreeTextEntry1] : The patient is a 63-year-old G1, P1 postmenopausal white female of [employer] descent.  She underwent menarche at age 13 and had her first child at age 34.  She underwent menopause when she had a radical TABBY/BSO in  at the time of her  when she had a significant stage III cervical cancer.  She was on Premarin for 5 years after the TABBY/BSO and then took tamoxifen for 5 years after that.  She has a strong family history of breast and ovarian cancer with her mother who  of ovarian cancer at age 45 and her maternal aunt who  from metastatic breast cancer at age 50.  The patient herself was diagnosed with a left breast upper outer quadrant breast cancer in  and underwent a left breast partial mastectomy and sentinel lymph node biopsy by Dr. Ramos at [facility] on 2013 for 1.6 cm moderately differentiated invasive duct cancer with 2 negative sentinel lymph nodes which was ER/NY strongly positive and HER2 positive by FISH with a Ki-67 of 25%.  She had a FISH ratio of 2.3 and a total HER2 count of 7.4.  The patient underwent chemotherapy at [facility] through Dr. Cotto and had Taxol with a full year of Herceptin but she refused any radiation therapy or endocrine therapy.  She then had an abnormal MRI on May 18, 2016 with a suspicious mass in the wide excision site in the left breast 1:00 region which was also seen on ultrasound is an 8 mm density and ultrasound core biopsy on May 18, 2016 showed a recurrent moderately differentiated invasive duct cancer which was again ER/NY positive and HER2 equivocal by IHC but turned out to be negative by FISH with a ratio of 1.3 and total HER2 count of 3.5.  She then tested BRCA2 positive with a 6174 deletion T mutation.  PET scan at that time showed no distant disease.  A mammography showed some other calcifications in the left breast but the patient decided to undergo bilateral mastectomies with bilateral nipple sparing technique with a prophylactic right breast mastectomy and direct to implant reconstruction with a repeat left axillary sentinel lymph node biopsy which was performed on 2016.  Final pathology showed 2 negative sentinel lymph nodes in the right breast was negative.  The left breast did show some foci of invasive duct cancer measuring 1 cm, 3 mm, and 1 cm respectively.  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  Some of the cancer was found in detached fragments which was on the anterior margin underneath the previous wide excision site.  I removed the further anterior margin which was completely negative so the final margins were all negative.  Final pathology showed the cancer to be ER/NY positive HER2 equivocal by IHC with a Ki-67 of 25% with a FISH ratio of 1.3 and total HER2 count of 3.5.  The patient saw Dr. Carrera at [facility] and an Oncotype Dx recurrence score was 22.  She underwent CMF chemotherapy which finished on 2016.  She later had her implants exchanged for cosmetic reasons with textured implants by Dr. Durán.  She was placed on toremifene.  She then underwent a right upper lobe lung resection for lung cancer by Dr. Zacarias Bunch performed in .  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  In 2021, she was admitted with a TIA and vertigo and found to have a brain aneurysm.  She also had a short bout of A. fib and has been on Eliquis.  She had a brain aneurysm clipping at [facility] in 2022.  She had been seeing Dr. Watson at [facility] and has remained on toremifene since  and has now been following up with Dr. Gregory and Dr. Pascual and was advised to come off of the toremifene because of history of a TIA/stroke but the patient will only agree to a toremifene and has been kept on Eliquis while on the medication.  She has developed a recurrence along the staple line of her right lung cancer found on recent PET scan performed in May 2023 and CT scan performed in 2023.  She also been found to have some left axillary adenopathy which was hypermetabolic on PET scan and directed ultrasound and then ultrasound-guided core biopsy was performed on May 12, 2023 of this left lower axillary finding and this showed recurrent moderately differentiated invasive duct cancer with minimal lymphoid tissue and involving skeletal muscle and fibroadipose tissue which was ER positive between 91 and 100% and NY strongly positive between 81 and 90% and this was HER2 negative by FISH with a Ki-67 between 20 and 30%.  She underwent SBRT radiation for the lung recurrence and was seen by radiation oncology as well as medical oncology and was refusing any neoadjuvant chemotherapy or chest wall radiation.  Eventually, however the patient would except local excision of this chest wall density with a lower axilla lymph node dissection which was performed on 2023.  The final pathology showed this chest wall lesion to be a completely replaced axillary lymph node and the node dissection showed a total of 7 out of 17 positive lymph nodes with 5 at the 7 involved nodes with extranodal extension and there was lymphovascular invasion present in the surrounding adipose tissue.  This remained ER/NY strongly positive and HER2 negative with a high Ki-67 at 25%.  This was a recurrent pathologic prognostic stage IIB breast cancer but pathologic anatomic stage IIIA.  She was seen by Dr. Gregory and Dr. Watson for a medical oncology evaluation postoperatively and eventually agreed to chemotherapy and had a port placed and she finished dose dense AC and Taxol with possible olaparib ??????. she did start tamoxifen and low-dose Verzenio after chemotherapy.  She had initially agreed to regional left axillary and supraclavicular curtis irradiation but she was seen by Dr. Dawson in 2024 and has not moved forward with radiation therapy.  She was also considering proton beam therapy.  She comes in now for routine follow-up.

## 2024-04-19 NOTE — ASSESSMENT
[FreeTextEntry1] : The patient is a 63-year-old G1, P1 postmenopausal white female of Ashkenazi Sabian[place] descent.  She underwent menarche at age 13 and had her first child at age 34.  She underwent menopause when she had a radical TABBY/BSO in  at the time of her  when she had a significant stage III cervical cancer.  She was on Premarin for 5 years after the TABBY/BSO and then took tamoxifen for 5 years after that.  She has a strong family history of breast and ovarian cancer with her mother who  of ovarian cancer at age 45 and her maternal aunt who  from metastatic breast cancer at age 50.  The patient herself was diagnosed with a left breast upper outer quadrant breast cancer in  and underwent a left breast partial mastectomy and sentinel lymph node biopsy by Dr. Ramos at [facility] on 2013 for 1.6 cm moderately differentiated invasive duct cancer with 2 negative sentinel lymph nodes which was ER/TN strongly positive and HER2 positive by FISH with a Ki-67 of 25%.  She had a FISH ratio of 2.3 and a total HER2 count of 7.4.  The patient underwent chemotherapy at [facility] through Dr. Cotto and had Taxol with a full year of Herceptin but she refused any radiation therapy or endocrine therapy.  She then had an abnormal MRI on May 18, 2016 with a suspicious mass in the wide excision site in the left breast 1:00 region which was also seen on ultrasound as an 8 mm density and ultrasound core biopsy on May 18, 2016 showed a recurrent moderately differentiated invasive duct cancer which was again ER/TN positive and HER2 equivocal by IHC but turned out to be negative by FISH with a ratio of 1.3 and total HER2 count of 3.5.  She then tested BRCA2 positive with a 6174 deletion T mutation.  PET scan at that time showed no distant disease.  A mammography showed some other calcifications in the left breast but the patient decided to undergo bilateral mastectomies with bilateral nipple sparing technique with a prophylactic right breast mastectomy and direct to implant reconstruction with a repeat left axillary sentinel lymph node biopsy which was performed on 2016.  Final pathology showed 2 negative sentinel lymph nodes and the right breast was negative.  The left breast did show some foci of invasive duct cancer measuring 1 cm, 3 mm, and 1 cm respectively.  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  Some of the cancer was found in detached fragments which was on the anterior margin underneath the previous wide excision site.  I removed the further anterior margin which was completely negative so the final margins were all negative.  Final pathology showed the cancer to be ER/TN positive HER2 equivocal by IHC with a Ki-67 of 25% with a FISH ratio of 1.3 and total HER2 count of 3.5.  The patient saw Dr. Carrera at [facility] and an Oncotype Dx recurrence score was 22.  She underwent CMF chemotherapy which finished on 2016.  She later had her implants exchanged for cosmetic reasons with textured implants by Dr. Durán.  She was placed on toremifene.  She then underwent a right upper lobe lung resection for lung cancer by Dr. Zacarias Bunch performed in .  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  In 2021, she was admitted with a TIA and vertigo and found to have a brain aneurysm.  She also had a short bout of A. fib and has been on Eliquis.  She had a brain aneurysm clipping at [facility] in 2022.  She had been seeing Dr. Watson at [facility] and has remained on toremifene since  and has now been following up with Dr. Gregory and Dr. Pascual and was advised to come off of the toremifene because of history of a TIA/stroke but the patient will only agree to a toremifene and has been kept on Eliquis while on the medication.  She has developed a recurrence along the staple line of her right lung cancer found on recent PET scan performed in May 2023 and CT scan performed in 2023.  She also been found to have some left axillary adenopathy which was hypermetabolic on PET scan and directed ultrasound and then ultrasound-guided core biopsy was performed on May 12, 2023 of this left lower axillary finding and this showed recurrent moderately differentiated invasive duct cancer with minimal lymphoid tissue and involving skeletal muscle and fibroadipose tissue which was ER positive between 91 and 100% and TN strongly positive between 81 and 90% and this was HER2 negative by FISH with a Ki-67 between 20 and 30%.  She underwent SBRT radiation for the lung recurrence and was seen by radiation oncology as well as medical oncology and was refusing any neoadjuvant chemotherapy or chest wall radiation.  Eventually, however the patient would except local excision of this chest wall density with a lower axilla lymph node dissection which was performed on 2023.  The final pathology showed this chest wall lesion to be a completely replaced axillary lymph node and the node dissection showed a total of 7 out of 17 positive lymph nodes with 5 of the 7 involved nodes with extranodal extension and there was lymphovascular invasion present in the surrounding adipose tissue.  This remained ER/TN strongly positive and HER2 negative with a high Ki-67 at 25%.  This was a recurrent pathologic prognostic stage IIB breast cancer but pathologic anatomic stage IIIA.  She was seen by Dr. Gregory and Dr. Watson for a medical oncology evaluation postoperatively and eventually agreed to chemotherapy and had a port placed and completed dose dense AC and Taxol with possible olaparib ??????. she did except tamoxifen and low-dose Verzenio which was started after chemotherapy.  She had initially agreed to regional left axillary and supraclavicular curtis irradiation but she was seen by Dr. Dawson in 2024 and has not moved forward with radiation therapy.  ???? did she see Dr. Guzman again ?????? She tells me now that she would like to look into proton beam therapy ??????.  On exam today, she healed well from her local chest wall excision and lower left axillary lymph node dissection and has good range of motion.  I cannot feel any evidence of recurrence over the left implant and no suspicious findings over her right implant.  She was reassured that she is doing well but she was encouraged to move forward with radiation therapy. She has lots of complaints ?????? but overall I told her she is doing well and I am happy with her progress.  She understands the benefits of tamoxifen and Verzenio and will remain on the treatment and continue follow-up with Dr. Gregory.  I would like to see her again in 6 months around 2024.

## 2024-04-19 NOTE — REVIEW OF SYSTEMS
[Feeling Tired] : feeling tired [Palpitations] : palpitations [Depression] : depression [Negative] : Heme/Lymph [de-identified] : Sure of TIA/stroke with brain aneurysm clipping

## 2024-04-19 NOTE — REASON FOR VISIT
[Follow-Up: _____] : a [unfilled] follow-up visit [FreeTextEntry1] : The patient is of Ashkenazi Buddhism descent with a family history of her mother who  of ovarian cancer at age 45 and maternal aunt who  of metastatic breast cancer at age 50.  The patient herself was diagnosed with a left breast upper outer quadrant breast cancer in  and underwent a partial mastectomy and sentinel lymph node biopsy in 2013 by Dr. Jo in [facility] for 1.6 cm moderately differentiated ductal cancer with 2 negative nodes which was triple positive for which she underwent chemotherapy with Taxol and a year of Herceptin at [facility] with Dr. Cotto.  She refused radiation therapy and endocrine therapy and eventually developed a recurrence in the left breast upper outer quadrant diagnosed after ultrasound-guided core biopsy in May 2016 and this cancer was ER/IN positive and HER2/valentino equivocal by IHC but negative by FISH.  She tested BRCA2 positive and underwent bilateral nipple sparing mastectomies with a prophylactic right breast mastectomy and repeat left axillary sentinel lymph node biopsy with direct to implant reconstruction on 2016.  Final pathology showed some residual foci of invasive duct cancer with the largest measuring 1 cm and 2 negative sentinel lymph nodes and Oncotype recurrence score was 22 and she was treated with CMF chemotherapy by Dr. Carrera at [facility].  She had her implants exchanged for cosmetic reasons by Dr. Durán.  She underwent a right upper lobe lung resection by Dr. Zacarias Bunch in  for lung cancer. She now comes in for interval follow-up after she has been found to have a recurrence of her lung cancer along the staple line in the right lung and an enlarged left axillary lymph node seen on a PET scan from May 2023 and CAT scan performed in 2023.  Ultrasound was performed and ultrasound-guided core biopsy was performed on May 12, 2023 showing recurrent moderately differentiated invasive ductal breast cancer involving skeletal muscle and adipose tissue which was ER/IN positive and HER2 negative with a high Ki-67.  She underwent SBRT radiation for the lung cancer and was seen by radiation oncology and medical oncology and she was refusing any upfront neoadjuvant chemotherapy as well as chest wall radiation but would except the chest wall excision and lower axillary lymph node sampling which was performed on 2023.  Final pathology showed a total of 7 out of 17 positive lower left axillary lymph nodes and there was extranodal extension found in 5 of the positive nodes with lymphovascular invasion present in the accompanying fatty axillary tissue.  She was sent back to medical oncology and saw Dr. Watson and Dr. Gregory for an evaluation and she eventually agreed to chemotherapy and she underwent dose dense AC-T and possible Olaparib with Dr. Gregory.  She did start tamoxifen and low-dose Verzenio after chemotherapy..  She had agreed to left axillary and supraclavicular curtis irradiation with Dr. Guzman prior to her surgery but she was seen by Dr. Dawson in 2024 and has still not proceeded with radiation therapy.  She comes in now for routine follow-up.

## 2024-04-19 NOTE — PAST MEDICAL HISTORY
[Postmenopausal] : The patient is postmenopausal [Menarche Age ____] : age at menarche was [unfilled] [Menopause Age____] : age at menopause was [unfilled] [Total Preg ___] : G[unfilled] [Live Births ___] : P[unfilled]  [Age At Live Birth ___] : Age at live birth: [unfilled] [History of Hormone Replacement Treatment] : has no history of hormone replacement treatment [de-identified] : s/p TABBY/BSO for cervical cancer [id number]

## 2024-04-19 NOTE — PHYSICAL EXAM
[Normocephalic] : normocephalic [Atraumatic] : atraumatic [EOMI] : extra ocular movement intact [Supple] : supple [No Supraclavicular Adenopathy] : no supraclavicular adenopathy [No Cervical Adenopathy] : no cervical adenopathy [Examined in the supine and seated position] : examined in the supine and seated position [No dominant masses] : no dominant masses in right breast  [No dominant masses] : no dominant masses left breast [No Nipple Retraction] : no left nipple retraction [No Nipple Discharge] : no left nipple discharge [Breast Mass Right Breast ___cm] : no masses [Breast Mass Left Breast ___cm] : no masses [No Axillary Lymphadenopathy] : no left axillary lymphadenopathy [No Edema] : no edema [No Rashes] : no rashes [No Ulceration] : no ulceration [Breast Nipple Inversion] : nipples not inverted [Breast Nipple Retraction] : nipples not retracted [Breast Nipple Flattening] : nipples not flattened [Breast Nipple Fissures] : nipples not fissured [Breast Abnormal Lactation (Galactorrhea)] : no galactorrhea [Breast Abnormal Secretion Bloody Fluid] : no bloody discharge [Breast Abnormal Secretion Serous Fluid] : no serous discharge [Breast Abnormal Secretion Opalescent Fluid] : no milky discharge [de-identified] : On exam, the patient has obvious bilateral nipple sparing mastectomies and direct to implant reconstructions with this history of recurrent left axillary curtis metastasis in a BRCA2 positive patient.  I cannot feel any suspicious findings over either implant.  She has healed well from her left axillary lymph node dissection and her prior axillary cording has completely resolved by going to the gym and performing aggressive range of motion exercise.  She has no evidence of recurrence.  She has no axillary, supraclavicular, or cervical adenopathy. [de-identified] : Status post prophylactic nipple sparing mastectomy with direct to implant reconstruction with no suspicious findings. [de-identified] : Status post nipple sparing mastectomy with direct to implant reconstruction and now status post recent left lower axillary lymph node dissection.  Her axillary wound has healed well and she has excellent range of motion and no evidence of recurrence

## 2024-04-24 ENCOUNTER — RESULT REVIEW (OUTPATIENT)
Age: 64
End: 2024-04-24

## 2024-04-24 ENCOUNTER — APPOINTMENT (OUTPATIENT)
Dept: HEMATOLOGY ONCOLOGY | Facility: CLINIC | Age: 64
End: 2024-04-24
Payer: COMMERCIAL

## 2024-04-24 VITALS
WEIGHT: 152 LBS | TEMPERATURE: 98.3 F | HEART RATE: 76 BPM | SYSTOLIC BLOOD PRESSURE: 114 MMHG | BODY MASS INDEX: 25.95 KG/M2 | OXYGEN SATURATION: 99 % | DIASTOLIC BLOOD PRESSURE: 60 MMHG | HEIGHT: 64 IN | RESPIRATION RATE: 16 BRPM

## 2024-04-24 DIAGNOSIS — Z17.0 MALIGNANT NEOPLASM OF OVERLAPPING SITES OF UNSPECIFIED FEMALE BREAST: ICD-10-CM

## 2024-04-24 DIAGNOSIS — Z90.13 ACQUIRED ABSENCE OF BILATERAL BREASTS AND NIPPLES: ICD-10-CM

## 2024-04-24 DIAGNOSIS — C34.90 MALIGNANT NEOPLASM OF UNSPECIFIED PART OF UNSPECIFIED BRONCHUS OR LUNG: ICD-10-CM

## 2024-04-24 DIAGNOSIS — R05.9 COUGH, UNSPECIFIED: ICD-10-CM

## 2024-04-24 DIAGNOSIS — C50.819 MALIGNANT NEOPLASM OF OVERLAPPING SITES OF UNSPECIFIED FEMALE BREAST: ICD-10-CM

## 2024-04-24 PROCEDURE — 36415 COLL VENOUS BLD VENIPUNCTURE: CPT

## 2024-04-24 PROCEDURE — 99215 OFFICE O/P EST HI 40 MIN: CPT

## 2024-04-24 RX ORDER — COLD-HOT PACK
50 EACH MISCELLANEOUS
Refills: 0 | Status: ACTIVE | COMMUNITY

## 2024-04-24 RX ORDER — BENZONATATE 100 MG/1
100 CAPSULE ORAL EVERY 6 HOURS
Qty: 120 | Refills: 0 | Status: ACTIVE | COMMUNITY
Start: 2024-04-24 | End: 1900-01-01

## 2024-04-24 NOTE — HISTORY OF PRESENT ILLNESS
[de-identified] : Ms. Roman is a 61 year old woman who presents for endocrine therapy consultation. Patient is on toremifene because she takes wellbutrin which is contraindicated with tamoxifen. Patient is seeing us today because her current oncologist does not want her on toremifene and wants to place her on an AI but patient is refusing. Patient states that last week she experienced her first migraine since her brain surgery and states that she left a note on her night stand for her daughter because the patient did not believe she would wake up. Patient states that she has PTSD due to her medical history. Patients goal is to be treated by Dr. Gregory Because she is passionate about her quality of life over her quantity. She wishes to be viewed as a whole person and is advocating to be placed back on toremifene. \par  \par  DEXA shows osteopenia- patient denied Fosamax \par  \par  Patient had a tumor removed from her chest cavity at 15. Cervical Ca dx 10 weeks into her 1st pregnancy at 34 years old. \par  \par  2013- left breast upper outer IDC; s/p partial mastectomy + sentinel lymph node biopsy Dr. Jo ([place])\par  1.6cm moderately differentiated IDC with 2 negative nodes, triple positive, s/p taxol + herceptin x12 months Dr. Cotto ([facility])\par  She refused XRT and endocrine therapy and eventually developed recurrence in the left breast upper outer quadrant\par  \par  2016 ER/AZ positive, HER 2 equivical by IHC, negative by FISH; BRCA positive with 6174 deletion T mutation\par  s/p bilateral nipple sparing mastectomies, repeat left sentinel node biopsy, with direct to implant reconstruction\par  Path: residual foci IDC, largest measuring 1cm and 2 negative nodes, oncotype 22\par  s/p CMF Dr. Carrera ([place]) 12/2016,  patient not willing to take AI or tamoxifen and placed on toremifene \par  s/p implant exchange Dr. Durán \par  \par  2020 s/p RUL resection Dr. Zacarias Frye\par  \par  10/2021 admitted for TIA and vertigo given TPA but found to have brain aneurysm; placed on eliquis\par  \par  2/2022 brain aneurysm clipping at [facility] followed by Dr. 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 - on Topamax \par  \par  Age of Menarche: 13\par  Age of Menaopause: s/p TABBY/BSO 1995 for stage III cervical cancer\par  OCP/HRT: premarin x5 years post TABBY, tamoxifen x 5 years\par  \par  BRCA 2 positive [de-identified] : Patient seen and examined and here today for follow up Overall feels well but states has a cold for the last 2 weeks States started tamoxifen - Complains of fatigue Started verzenio - BID complaints of aching bones, stomach pains, and increased fatigue; also now expresses episodes of constipation Head CT performed at [facility] on 3/5 - all normal Will be seeing dr. Croft this week - still has been having intermittent pain left breast in upper left quadrant, believes it is lymphedema

## 2024-04-24 NOTE — REVIEW OF SYSTEMS
[Abdominal Pain] : abdominal pain [Joint Pain] : joint pain [Joint Stiffness] : joint stiffness [Anxiety] : anxiety [Negative] : Allergic/Immunologic [Fever] : fever [Chills] : chills [Night Sweats] : night sweats [Fatigue] : fatigue [Recent Change In Weight] : ~T recent weight change [Chest Pain] : chest pain [SOB on Exertion] : shortness of breath during exertion [Constipation] : constipation [Insomnia] : insomnia [FreeTextEntry4] : constant dry mouth; runny nose for weeks before having a cold [FreeTextEntry5] : intermittent chest pain for the past few weeks [FreeTextEntry9] : stiffness and pain in bilateral knees for the last few weeks, aching bones [de-identified] : PTSD

## 2024-04-24 NOTE — ASSESSMENT
[FreeTextEntry1] : #Breast cancer 2013- left breast upper outer IDC; s/p partial mastectomy + sentinel lymph node biopsy Dr. Jo ([facility]) 1.6cm moderately differentiated IDC with 2 negative nodes, triple positive, s/p taxol + herceptin x12 months [place] [place] refused XRT and endocrine therapy and eventually developed recurrence in the left breast upper outer quadrant 2016 ER/ME positive, HER 2 equivocal by IHC, negative by FISH; BRCA positive with 6174 deletion T mutation s/p bilateral nipple sparing mastectomies, repeat left sentinel node biopsy, with direct to implant reconstruction Path: residual foci IDC, largest measuring 1cm and 2 negative nodes, oncotype 22 s/p CMF Dr. Carrera ([place]) 12/2016, patient not willing to take AI or tamoxifen and placed on toremifene s/p implant exchange Dr. Durán Patient refuses AI or Tamoxifen. She has been on Tormefine in the past. We have discussed that this is typically not the medication we use in the adjuvant setting and that this is used in the metastatic setting. We have also discussed that this increases her risk of VTE and TIA/stroke. She understands her risk and would still like to proceed. I have advised that she remain on Eliquis while she is on this medication. I do believe that given her history of ER+ breast cancer that she would benefit from Endocrine therapy and she completely understands the risks but wants to proceed with Tormefine. 7/6/23 - vs reviewed. labs drawn in office today. Reviewed PET report and final pathology showed a total of 7 out of 17 positive lower left axillary lymph nodes and there was extranodal extension found in 5 of the positive nodes with lymphovascular invasion present in the accompanying fatty axillary tissue. We discussed the recommendation of chemotherapy with ddAC -->T vs TC. She is concerned about permanent alopecia associated with Taxotere. Reviewed side effect profile of dd AC --> T. Adriamycin 60 mg/m2 and cytoxan 600 mg/m2 q 2 weeks for 4 cycles with onpro for bone marrow convalescence. This will be followed by Taxol 80 mg/m2 weekly x 12 doses. We have reviewed the side effects to include but are not limited to cytopenias with increased risk of infection, sepsis and even death, N/V, Fatigue, alopecia, <1% chance of leukemia in the future and neuropathy. Since AC is high emetogenic risk will give Aloxi 0.25 mg IV, Emend 150 mg IV and decadron 10 mg IV with each cycle. will need follow up with Dr. Zamorano Then would recommend verzenio + tamoxifen as patient has osteoporosis and does not want bisphosphonate therapy or olaparib given BRCA2 mutation. I would consider changing Tormifene to another endocrine therapy either an AI or Fulvestrant. She is concerned about the side effect profile of AIs. She is wanting to try Tamoxifen. If she does this she will need to come off wellbutrin in the future and change over to citalopram, escitalopram or venlafaxine. She will talk to her therapist about this. 7/31/23 - vs reviewed. Had port placed today. Reviewed echo - EF wnl. Acceptable for treatment. CT chest stable lung nodules - no evidence of recurrent malignancy. Reviewed side effect profile again of chemo. All questions have been addressed and answered to patients satisfaction. She is seeing the dentist for evaluation and will call when ready for chemo. 8/31/23 - vs and labs reviewed. labs drawn in office today. wbc, hgb and plts wnl. slightly elevated ALKP - monitor otherwise kidney function and liver function wnl. proceed with cycle 2 of ddAC with onpro. 9/14/23 - vs reviewed. labs drawn in office today. wbc 12.77, hgb 12.9, plts 174. elevated ALKP 152 - likely due to chemo. electrolytes wnl, otherwise kidney function and liver function wnl. 9/28/23 - vs reviewed. labs drawn in office today. wbc 16 - likely due to onpro. monitor. hgb 11.7 - monitor and plts wnl. K low - she will increase K rich foods. ALKP 170 - likely due to chemo. monitor. proceed with cycle 4 of ddAC with onpro 10/12/23 vs reviewed. labs drawn in office today, wbc 18.9, hgb wnl, plts wnl. proceed with taxol. 10/19/23 - vs reviewed. labs drawn in office today. wbc wnl, plts wnl. hgb 10.5 - minitor - likely due to chemo. K 3.3 - will replace otherwise liverfunction and kidney function ok to proceed with treatment. proceed with taxol 10/26/23 - vs reviewed. labs drawn in office today. wbc and plts wnl. hgb 10.3 - monitor - due to chemo. electrolytes, kidney function and liver function wnl. ok to proceed with treatment. proceed with taxol #3. 11/2/23 - vs reviewed. labs drawn in office today. wbc 4.98, hgb 10.2, plt 300. elevated ALT 48 - monitor. ok to proceed with treatment. proceed with taxol #4. She does not want Radiation but has agreed to have a conversation with Dr. Dawson, Radiation oncology. She is concerned about the side effect profile of AIs. She is wanting to try Tamoxifen after chemo. If she does this she will need to come off wellbutrin in the future and change over to citalopram, escitalopram or venlafaxine. She will talk to Dr. Pierce. She is concerned about the side effect profile of verzenio or olaparib affecting her quality of life. She will think about these medications and read more about them. 11/9/23 - vs and labs reviewed. labs drawn in office today. wbc 4.1, hgb 11.1, plts 212 - ok to proceed with taxol #5. will have her see Dr. Dawson as she gets closer to cycle 12. she is willing to take verzenio after chemo however she wants to start at 100 mg BID. I am ok with this as I have previously seen patients with extensive side effects at 150 mg BID and I believe this patient would stop treatment if she has significant side effects. Again she is not willing to try an AI. She will start on Tamoxife as well. 11/16/23 vs and labs reviewed. labs drawn in office today. wbc 4.35, hgb 10.8, plts 198. ok to proceed with cycle 6. elevation in ALT/AST likely due to taxol - will monitor. She is not coming next Thursday due to Thanksgiving. She will proceed the next Thursday - I would like to see if ALT/AST downtrends with the week off. If no improvement will image liver. 11/30/23 - vs and labs reviewed. labs drawn in office today. wbc 4.95, hgb 12, plts 214. ALT down trending, AST wnl. Ca+, electrolytes and kidney function wnl. proceed with cycle 7 today. I will refer her to RT to schedule an appt to discuss RT with Dr. Dawson 12/7/23 - vs and labs reviewed. labs drawn in office today. wbc 4.45, hgb 11.7, plts wnl, CMP reviewed ALT 39 -likely due to chemo - monitor. proceed with cycle 8 today. She is discussing with Dr. Pierce about weaning off wellbutrin and Dr. Pierce is looking into the literature regarding tamoxifen and wellbutrin 12/14/23 - vs and labs reviewed. labs drawn in office today. wbc 4.45, hgb 11.7, plts wnl, CMP reviewed ALT 39 -likely due to chemo - monitor. proceed with cycle 9 today. Reached out to Radiation for them to reach out to patient to discuss role of RT.  12/28/23 0 vs and labs reviewed. labs drawn in office today. wbc 4.47, hgb 12.8, plts wnl, CMP reviewed ALT 35 -likely due to chemo - monitor. proceed with cycle 11 today. Last imaging in regards to her cancers was 7/23. recommend PET CT 1/24 to make sure there is no distant disease prior to starting verzenio and tamoxifen. She will also have the discussion with Dr. Dawson  1/4/24 - vs and labs reviewed. labs drawn in office today. completing taxol 12 cycles. Plan for PET CT. Did not have eval with radiation here. Recommend verzenio and tamoxifen  1/31/24 - vs and labs reviewed. pet ct reviewed from 1/24/24 -  Since PET/CT from 5/2/2023, the FDG avid left axillary lymph node has been removed. There is no residual lymph node FDG avidity or lymphadenopathy. FDG avid focus in the right eighth rib fracture. Mildly avid right posterior residual upper lobe fibrosis and decreased avidity of right lung apical nodule, likely due to radiation treatment. She continues to not want radiation thus she will start tamoxifen 20 mg daily. She will start verzenio as well however she is concerned about side effects thus would recommend starting slowly. will have her take verzenio 100 mg daily and will slowly increase her to 2 pills daily 3/20/24 - vs and labs reviewed. labs drawn in office today. continue tamoxifen and verzenio 100 mg BID. 4/24/24 - vs and labs reviewed. labs drawn in office today. continue tamoxifen and verzenio 100 mg BID. overall tolerating well. sees Dr. Croft this week.  plan for CT Chest - FDG avid focus in the right eighth rib fracture. Mildly avid right posterior residual upper lobe fibrosis and decreased avidity of right lung apical nodule, likely due to radiation treatment.  #BRCA 2 PET from 1/24 showed no evidence of pancreatic uptake. will need MRI pancreas 5/24 Breast MRI -MRI breast 10/22 - reviewed- no evidence of malignancy. Derm - follow up with Dr. Acosta or Dr. Ruffin S/p mastectomy and TAHBSO  #lung cancer with recurrence s/p SBRT reviewed PET CT 1/24- Mildly avid right posterior residual upper lobe fibrosis and decreased avidity of right lung apical nodule, likely due to radiation treatment CT Chest  # TIA likely 2/2 afib and aneurysm on eliquis  #osteoporosis taking ca++ and vit D does not want bisphosphonates (oral, SQ or IV) but continue to discuss this  #anxiety on lorazepam - she takes this during the day and then she takes xanax at night to sleep because it is more effective   Follow in 8 weeks with cbc with diff, cmp, LDH, ESR/CRP, mag, iron, ferritin, b12, folate, magnesium

## 2024-04-24 NOTE — CONSULT LETTER
[Dear  ___] : Dear  [unfilled], [Consult Letter:] : I had the pleasure of evaluating your patient, [unfilled]. [Please see my note below.] : Please see my note below. [Consult Closing:] : Thank you very much for allowing me to participate in the care of this patient.  If you have any questions, please do not hesitate to contact me. [Sincerely,] : Sincerely, [FreeTextEntry3] : Heidi Gregory DO, FACJOSELITO, FACP\par  Medical Oncology and Hematology\par  [facility]\par

## 2024-04-26 ENCOUNTER — APPOINTMENT (OUTPATIENT)
Dept: BREAST CENTER | Facility: CLINIC | Age: 64
End: 2024-04-26
Payer: COMMERCIAL

## 2024-04-26 VITALS — BODY MASS INDEX: 25.95 KG/M2 | HEIGHT: 64 IN | WEIGHT: 152 LBS

## 2024-04-26 DIAGNOSIS — Z85.118 PERSONAL HISTORY OF OTHER MALIGNANT NEOPLASM OF BRONCHUS AND LUNG: ICD-10-CM

## 2024-04-26 DIAGNOSIS — Z12.39 ENCOUNTER FOR OTHER SCREENING FOR MALIGNANT NEOPLASM OF BREAST: ICD-10-CM

## 2024-04-26 DIAGNOSIS — Z15.09 GENETIC SUSCEPTIBILITY TO MALIGNANT NEOPLASM OF BREAST: ICD-10-CM

## 2024-04-26 DIAGNOSIS — Z85.3 PERSONAL HISTORY OF MALIGNANT NEOPLASM OF BREAST: ICD-10-CM

## 2024-04-26 DIAGNOSIS — Z15.01 GENETIC SUSCEPTIBILITY TO MALIGNANT NEOPLASM OF BREAST: ICD-10-CM

## 2024-04-26 PROCEDURE — 99213 OFFICE O/P EST LOW 20 MIN: CPT

## 2024-04-26 NOTE — PHYSICAL EXAM
[Normocephalic] : normocephalic [Atraumatic] : atraumatic [EOMI] : extra ocular movement intact [Supple] : supple [No Supraclavicular Adenopathy] : no supraclavicular adenopathy [No Cervical Adenopathy] : no cervical adenopathy [Examined in the supine and seated position] : examined in the supine and seated position [No dominant masses] : no dominant masses in right breast  [No dominant masses] : no dominant masses left breast [No Nipple Retraction] : no left nipple retraction [No Nipple Discharge] : no left nipple discharge [Breast Mass Right Breast ___cm] : no masses [Breast Mass Left Breast ___cm] : no masses [No Axillary Lymphadenopathy] : no left axillary lymphadenopathy [No Edema] : no edema [No Rashes] : no rashes [No Ulceration] : no ulceration [Breast Nipple Inversion] : nipples not inverted [Breast Nipple Retraction] : nipples not retracted [Breast Nipple Flattening] : nipples not flattened [Breast Nipple Fissures] : nipples not fissured [Breast Abnormal Lactation (Galactorrhea)] : no galactorrhea [Breast Abnormal Secretion Bloody Fluid] : no bloody discharge [Breast Abnormal Secretion Serous Fluid] : no serous discharge [Breast Abnormal Secretion Opalescent Fluid] : no milky discharge [de-identified] : On exam, the patient has obvious bilateral nipple sparing mastectomies and direct to implant reconstructions with this history of recurrent left axillary curtis metastasis in a BRCA2 positive patient.  I cannot feel any suspicious findings over either implant.  She has healed well from her left axillary lymph node dissection and her prior axillary cording completely resolved by going to the gym and performing aggressive range of motion exercise.  She has no evidence of recurrence.  She has no axillary, supraclavicular, or cervical adenopathy. [de-identified] : Status post prophylactic nipple sparing mastectomy with direct to implant reconstruction with no suspicious findings. [de-identified] : Status post nipple sparing mastectomy with direct to implant reconstruction and now status post recent left lower axillary lymph node dissection.  Her axillary wound has healed well and she has excellent range of motion and no evidence of recurrence

## 2024-04-26 NOTE — HISTORY OF PRESENT ILLNESS
[FreeTextEntry1] : The patient is a 63-year-old G1, P1 postmenopausal white female of [place] descent.  She underwent menarche at age 13 and had her first child at age 34.  She underwent menopause when she had a radical TABBY/BSO in  at the time of her  when she had a significant stage III cervical cancer.  She was on Premarin for 5 years after the TABBY/BSO and then took tamoxifen for 5 years after that.  She has a strong family history of breast and ovarian cancer with her mother who  of ovarian cancer at age 45 and her maternal aunt who  from metastatic breast cancer at age 50.  The patient herself was diagnosed with a left breast upper outer quadrant breast cancer in  and underwent a left breast partial mastectomy and sentinel lymph node biopsy by Dr. Ramos at [facility] on 2013 for 1.6 cm moderately differentiated invasive duct cancer with 2 negative sentinel lymph nodes which was ER/FL strongly positive and HER2 positive by FISH with a Ki-67 of 25%.  She had a FISH ratio of 2.3 and a total HER2 count of 7.4.  The patient underwent chemotherapy at [facility] through Dr. Cotto and had Taxol with a full year of Herceptin but she refused any radiation therapy or endocrine therapy.  She then had an abnormal MRI on May 18, 2016 with a suspicious mass in the wide excision site in the left breast 1:00 region which was also seen on ultrasound is an 8 mm density and ultrasound core biopsy on May 18, 2016 showed a recurrent moderately differentiated invasive duct cancer which was again ER/FL positive and HER2 equivocal by IHC but turned out to be negative by FISH with a ratio of 1.3 and total HER2 count of 3.5.  She then tested BRCA2 positive with a 6174 deletion T mutation.  PET scan at that time showed no distant disease.  A mammography showed some other calcifications in the left breast but the patient decided to undergo bilateral mastectomies with bilateral nipple sparing technique with a prophylactic right breast mastectomy and direct to implant reconstruction with a repeat left axillary sentinel lymph node biopsy which was performed on 2016.  Final pathology showed 2 negative sentinel lymph nodes in the right breast was negative.  The left breast did show some foci of invasive duct cancer measuring 1 cm, 3 mm, and 1 cm respectively.  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  Some of the cancer was found in detached fragments which was on the anterior margin underneath the previous wide excision site.  I removed the further anterior margin which was completely negative so the final margins were all negative.  Final pathology showed the cancer to be ER/FL positive HER2 equivocal by IHC with a Ki-67 of 25% with a FISH ratio of 1.3 and total HER2 count of 3.5.  The patient saw Dr. Carrera at [facility] and an Oncotype Dx recurrence score was 22.  She underwent CMF chemotherapy which finished on 2016.  She later had her implants exchanged for cosmetic reasons with textured implants by Dr. Durán.  She was placed on toremifene.  She then underwent a right upper lobe lung resection for lung cancer by Dr. Zacarias Bunch performed in .  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  In 2021, she was admitted with a TIA and vertigo and found to have a brain aneurysm.  She also had a short bout of A. fib and has been on Eliquis.  She had a brain aneurysm clipping at [facility] in 2022.  She had been seeing Dr. Watson at [facility] and has remained on toremifene since  and has now been following up with Dr. Gregory and Dr. Pascual and was advised to come off of the toremifene because of history of a TIA/stroke but the patient will only agree to a toremifene and has been kept on Eliquis while on the medication.  She has developed a recurrence along the staple line of her right lung cancer found on recent PET scan performed in May 2023 and CT scan performed in 2023.  She also been found to have some left axillary adenopathy which was hypermetabolic on PET scan and directed ultrasound and then ultrasound-guided core biopsy was performed on May 12, 2023 of this left lower axillary finding and this showed recurrent moderately differentiated invasive duct cancer with minimal lymphoid tissue and involving skeletal muscle and fibroadipose tissue which was ER positive between 91 and 100% and FL strongly positive between 81 and 90% and this was HER2 negative by FISH with a Ki-67 between 20 and 30%.  She underwent SBRT radiation for the lung recurrence and was seen by radiation oncology as well as medical oncology and was refusing any neoadjuvant chemotherapy or chest wall radiation.  Eventually, however the patient would except local excision of this chest wall density with a lower axilla lymph node dissection which was performed on 2023.  The final pathology showed this chest wall lesion to be a completely replaced axillary lymph node and the node dissection showed a total of 7 out of 17 positive lymph nodes with 5 at the 7 involved nodes with extranodal extension and there was lymphovascular invasion present in the surrounding adipose tissue.  This remained ER/FL strongly positive and HER2 negative with a high Ki-67 at 25%.  This was a recurrent pathologic prognostic stage IIB breast cancer but pathologic anatomic stage IIIA.  She was seen by Dr. Gregory and Dr. Watson for a medical oncology evaluation postoperatively and eventually agreed to chemotherapy and had a port placed and she finished dose dense AC and Taxol and olaparib was considered but never given.  She did start tamoxifen and low-dose Verzenio after chemotherapy.  She had initially agreed to regional left axillary and supraclavicular curtis irradiation but she was seen by Dr. Dawson in 2024 and she is decided against radiation therapy given the risk of lymphedema.  She was also considering proton beam therapy.  She comes in now for routine follow-up.

## 2024-04-26 NOTE — REVIEW OF SYSTEMS
[Feeling Tired] : feeling tired [Palpitations] : palpitations [Depression] : depression [Negative] : Heme/Lymph [de-identified] : Sure of TIA/stroke with brain aneurysm clipping

## 2024-04-26 NOTE — ASSESSMENT
[FreeTextEntry1] : The patient is a 63-year-old G1, P1 postmenopausal white female of Ashkenazi [place] descent.  She underwent menarche at age 13 and had her first child at age 34.  She underwent menopause when she had a radical TABBY/BSO in  at the time of her  when she had a significant stage III cervical cancer.  She was on Premarin for 5 years after the TABBY/BSO and then took tamoxifen for 5 years after that.  She has a strong family history of breast and ovarian cancer with her mother who  of ovarian cancer at age 45 and her maternal aunt who  from metastatic breast cancer at age 50.  The patient herself was diagnosed with a left breast upper outer quadrant breast cancer in  and underwent a left breast partial mastectomy and sentinel lymph node biopsy by Dr. Ramos at [facility] on 2013 for 1.6 cm moderately differentiated invasive duct cancer with 2 negative sentinel lymph nodes which was ER/TN strongly positive and HER2 positive by FISH with a Ki-67 of 25%.  She had a FISH ratio of 2.3 and a total HER2 count of 7.4.  The patient underwent chemotherapy at [facility] through Dr. Cotto and had Taxol with a full year of Herceptin but she refused any radiation therapy or endocrine therapy.  She then had an abnormal MRI on May 18, 2016 with a suspicious mass in the wide excision site in the left breast 1:00 region which was also seen on ultrasound as an 8 mm density and ultrasound core biopsy on May 18, 2016 showed a recurrent moderately differentiated invasive duct cancer which was again ER/TN positive and HER2 equivocal by IHC but turned out to be negative by FISH with a ratio of 1.3 and total HER2 count of 3.5.  She then tested BRCA2 positive with a 6174 deletion T mutation.  PET scan at that time showed no distant disease.  A mammography showed some other calcifications in the left breast but the patient decided to undergo bilateral mastectomies with bilateral nipple sparing technique with a prophylactic right breast mastectomy and direct to implant reconstruction with a repeat left axillary sentinel lymph node biopsy which was performed on 2016.  Final pathology showed 2 negative sentinel lymph nodes and the right breast was negative.  The left breast did show some foci of invasive duct cancer measuring 1 cm, 3 mm, and 1 cm respectively.  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  Some of the cancer was found in detached fragments which was on the anterior margin underneath the previous wide excision site.  I removed the further anterior margin which was completely negative so the final margins were all negative.  Final pathology showed the cancer to be ER/TN positive HER2 equivocal by IHC with a Ki-67 of 25% with a FISH ratio of 1.3 and total HER2 count of 3.5.  The patient saw Dr. Carrera at [facility] and an Oncotype Dx recurrence score was 22.  She underwent CMF chemotherapy which finished on 2016.  She later had her implants exchanged for cosmetic reasons with textured implants by Dr. Durán.  She was placed on toremifene.  She then underwent a right upper lobe lung resection for lung cancer by Dr. Zacarias Bunch performed in .  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  In 2021, she was admitted with a TIA and vertigo and found to have a brain aneurysm.  She also had a short bout of A. fib and has been on Eliquis.  She had a brain aneurysm clipping at [facility] in 2022.  She had been seeing Dr. Watson at [facility] and has remained on toremifene since  and has now been following up with Dr. Gregory and Dr. Pascual and was advised to come off of the toremifene because of history of a TIA/stroke but the patient will only agree to a toremifene and has been kept on Eliquis while on the medication.  She has developed a recurrence along the staple line of her right lung cancer found on recent PET scan performed in May 2023 and CT scan performed in 2023.  She also been found to have some left axillary adenopathy which was hypermetabolic on PET scan and directed ultrasound and then ultrasound-guided core biopsy was performed on May 12, 2023 of this left lower axillary finding and this showed recurrent moderately differentiated invasive duct cancer with minimal lymphoid tissue and involving skeletal muscle and fibroadipose tissue which was ER positive between 91 and 100% and TN strongly positive between 81 and 90% and this was HER2 negative by FISH with a Ki-67 between 20 and 30%.  She underwent SBRT radiation for the lung recurrence and was seen by radiation oncology as well as medical oncology and was refusing any neoadjuvant chemotherapy or chest wall radiation.  Eventually, however the patient would except local excision of this chest wall density with a lower axilla lymph node dissection which was performed on 2023.  The final pathology showed this chest wall lesion to be a completely replaced axillary lymph node and the node dissection showed a total of 7 out of 17 positive lymph nodes with 5 of the 7 involved nodes with extranodal extension and there was lymphovascular invasion present in the surrounding adipose tissue.  This remained ER/TN strongly positive and HER2 negative with a high Ki-67 at 25%.  This was a recurrent pathologic prognostic stage IIB breast cancer but pathologic anatomic stage IIIA.  She was seen by Dr. Gregory and Dr. Watson for a medical oncology evaluation postoperatively and eventually agreed to chemotherapy with Dr. Gregory and had a port placed and completed dose dense AC and Taxol and olaparib was considered but not given. She did except tamoxifen and low-dose Verzenio which was started after chemotherapy.  She had initially agreed to regional left axillary and supraclavicular curtis irradiation but she was seen by Dr. Dawson in 2024 and has not moved forward with radiation therapy despite its survival advantage because of an at least 25% risk of lymphedema.  On exam today, she healed well from her local chest wall excision and lower left axillary lymph node dissection and has good range of motion.  I cannot feel any evidence of recurrence over the left implant and no suspicious findings over her right implant.  She does have some mild edema of the skin of the left breast.  She was reassured that she is doing well but she was encouraged to move forward with radiation therapy but at this point it is unlikely she will accept radiation.  She understands the benefits of tamoxifen and Verzenio and will remain on the treatment and continue follow-up with Dr. Gregory.  I would like to see her again in 6 months around 2024.

## 2024-04-26 NOTE — PAST MEDICAL HISTORY
[Postmenopausal] : The patient is postmenopausal [Menarche Age ____] : age at menarche was [unfilled] [Menopause Age____] : age at menopause was [unfilled] [Total Preg ___] : G[unfilled] [Live Births ___] : P[unfilled]  [Age At Live Birth ___] : Age at live birth: [unfilled] [History of Hormone Replacement Treatment] : has no history of hormone replacement treatment [de-identified] : s/p TABBY/BSO for cervical cancer [id number]

## 2024-04-26 NOTE — REASON FOR VISIT
[Follow-Up: _____] : a [unfilled] follow-up visit [FreeTextEntry1] : The patient is of Ashken[place] descent with a family history of her mother who  of ovarian cancer at age 45 and maternal aunt who  of metastatic breast cancer at age 50.  The patient herself was diagnosed with a left breast upper outer quadrant breast cancer in  and underwent a partial mastectomy and sentinel lymph node biopsy in 2013 by Dr. Jo in [facility] for 1.6 cm moderately differentiated ductal cancer with 2 negative nodes which was triple positive for which she underwent chemotherapy with Taxol and a year of Herceptin at [facility] with Dr. Cotto.  She refused radiation therapy and endocrine therapy and eventually developed a recurrence in the left breast upper outer quadrant diagnosed after ultrasound-guided core biopsy in May 2016 and this cancer was ER/NJ positive and HER2/valentino equivocal by IHC but negative by FISH.  She tested BRCA2 positive and underwent bilateral nipple sparing mastectomies with a prophylactic right breast mastectomy and repeat left axillary sentinel lymph node biopsy with direct to implant reconstruction on 2016.  Final pathology showed some residual foci of invasive duct cancer with the largest measuring 1 cm and 2 negative sentinel lymph nodes and Oncotype recurrence score was 22 and she was treated with CMF chemotherapy by Dr. Carrera at [facility].  She had her implants exchanged for cosmetic reasons by Dr. Durán.  She underwent a right upper lobe lung resection by Dr. Zacarias Bunch in  for lung cancer. She was found to have a recurrence of her lung cancer along the staple line in the right lung and an enlarged left axillary lymph node seen on a PET scan from May 2023 and CAT scan performed in 2023.  Ultrasound was performed and ultrasound-guided core biopsy was performed on May 12, 2023 showing recurrent moderately differentiated invasive ductal breast cancer involving skeletal muscle and adipose tissue which was ER/NJ positive and HER2 negative with a high Ki-67.  She underwent SBRT radiation for the lung cancer and was seen by radiation oncology and medical oncology and she was refusing any upfront neoadjuvant chemotherapy as well as chest wall radiation but would except the chest wall excision and lower axillary lymph node sampling which was performed on 2023.  Final pathology showed a total of 7 out of 17 positive lower left axillary lymph nodes and there was extranodal extension found in 5 of the positive nodes with lymphovascular invasion present in the accompanying fatty axillary tissue.  She was sent back to medical oncology and saw Dr. Watson and Dr. Gregory for an evaluation and she eventually agreed to chemotherapy and she underwent dose dense AC-T and possible Olaparib with Dr. Gregory.  She did start tamoxifen and low-dose Verzenio after chemotherapy..  She had agreed to left axillary and supraclavicular curtis irradiation with Dr. Guzman prior to her surgery but she was seen by Dr. Dawson in 2024 and she decided against any radiation therapy despite its possible survival advantages because of her concerns about lymphedema.  She comes in now for routine follow-up.

## 2024-06-12 ENCOUNTER — RESULT REVIEW (OUTPATIENT)
Age: 64
End: 2024-06-12

## 2024-06-25 RX ORDER — ABEMACICLIB 100 MG/1
100 TABLET ORAL
Qty: 60 | Refills: 1 | Status: ACTIVE | COMMUNITY
Start: 2024-01-31 | End: 1900-01-01

## 2024-06-26 ENCOUNTER — RESULT REVIEW (OUTPATIENT)
Age: 64
End: 2024-06-26

## 2024-06-26 ENCOUNTER — APPOINTMENT (OUTPATIENT)
Dept: HEMATOLOGY ONCOLOGY | Facility: CLINIC | Age: 64
End: 2024-06-26
Payer: COMMERCIAL

## 2024-06-26 VITALS
BODY MASS INDEX: 26.8 KG/M2 | TEMPERATURE: 97.5 F | DIASTOLIC BLOOD PRESSURE: 52 MMHG | OXYGEN SATURATION: 97 % | RESPIRATION RATE: 16 BRPM | WEIGHT: 157 LBS | HEIGHT: 64 IN | SYSTOLIC BLOOD PRESSURE: 115 MMHG | HEART RATE: 69 BPM

## 2024-06-26 DIAGNOSIS — C77.3 MALIGNANT NEOPLASM OF UNSPECIFIED SITE OF LEFT FEMALE BREAST: ICD-10-CM

## 2024-06-26 DIAGNOSIS — C50.912 MALIGNANT NEOPLASM OF UNSPECIFIED SITE OF LEFT FEMALE BREAST: ICD-10-CM

## 2024-06-26 DIAGNOSIS — Z15.01 GENETIC SUSCEPTIBILITY TO MALIGNANT NEOPLASM OF BREAST: ICD-10-CM

## 2024-06-26 DIAGNOSIS — M81.0 AGE-RELATED OSTEOPOROSIS W/OUT CURRENT PATHOLOGICAL FRACTURE: ICD-10-CM

## 2024-06-26 DIAGNOSIS — K59.00 CONSTIPATION, UNSPECIFIED: ICD-10-CM

## 2024-06-26 DIAGNOSIS — Z17.0 ESTROGEN RECEPTOR POSITIVE STATUS [ER+]: ICD-10-CM

## 2024-06-26 DIAGNOSIS — Z15.09 GENETIC SUSCEPTIBILITY TO MALIGNANT NEOPLASM OF BREAST: ICD-10-CM

## 2024-06-26 DIAGNOSIS — Z90.13 ACQUIRED ABSENCE OF BILATERAL BREASTS AND NIPPLES: ICD-10-CM

## 2024-06-26 PROCEDURE — 99214 OFFICE O/P EST MOD 30 MIN: CPT

## 2024-06-26 PROCEDURE — 36415 COLL VENOUS BLD VENIPUNCTURE: CPT

## 2024-06-26 RX ORDER — TAMOXIFEN CITRATE 10 MG/1
10 TABLET, FILM COATED ORAL
Qty: 90 | Refills: 3 | Status: ACTIVE | COMMUNITY
Start: 2024-02-06 | End: 1900-01-01

## 2024-06-26 RX ORDER — ALPRAZOLAM 0.5 MG/1
0.5 TABLET ORAL
Qty: 30 | Refills: 0 | Status: ACTIVE | COMMUNITY
Start: 2023-08-04 | End: 1900-01-01

## 2024-06-26 NOTE — HISTORY OF PRESENT ILLNESS
[de-identified] : Ms. Roman is a 61 year old woman who presents for endocrine therapy consultation. Patient is on toremifene because she takes wellbutrin which is contraindicated with tamoxifen. Patient is seeing us today because her current oncologist does not want her on toremifene and wants to place her on an AI but patient is refusing. Patient states that last week she experienced her first migraine since her brain surgery and states that she left a note on her night stand for her daughter because the patient did not believe she would wake up. Patient states that she has PTSD due to her medical history. Patients goal is to be treated by Dr. Gregory Because she is passionate about her quality of life over her quantity. She wishes to be viewed as a whole person and is advocating to be placed back on toremifene. \par  \par  DEXA shows osteopenia- patient denied Fosamax \par  \par  Patient had a tumor removed from her chest cavity at 15. Cervical Ca dx 10 weeks into her 1st pregnancy at 34 years old. \par  \par  2013- left breast upper outer IDC; s/p partial mastectomy + sentinel lymph node biopsy Dr. Jo ([place])\par  1.6cm moderately differentiated IDC with 2 negative nodes, triple positive, s/p taxol + herceptin x12 months Dr. Cotto ([facility]par  She refused XRT and endocrine therapy and eventually developed recurrence in the left breast upper outer quadrant\par  \par  2016 ER/MT positive, HER 2 equivical by IHC, negative by FISH; BRCA positive with 6174 deletion T mutation\par  s/p bilateral nipple sparing mastectomies, repeat left sentinel node biopsy, with direct to implant reconstruction\par  Path: residual foci IDC, largest measuring 1cm and 2 negative nodes, oncotype 22\par  s/p CMF Dr. Carrera ([employer]) 12/2016,  patient not willing to take AI or tamoxifen and placed on toremifene \par  s/p implant exchange Dr. Durán \par  \par  2020 s/p RUL resection Dr. Zacarias Frye\par  \par  10/2021 admitted for TIA and vertigo given TPA but found to have brain aneurysm; placed on eliquis\par  \par  2/2022 brain aneurysm clipping at [facility] followed by Dr. 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 - on Topamax \par  \par  Age of Menarche: 13\par  Age of Menaopause: s/p TABBY/BSO 1995 for stage III cervical cancer\par  OCP/HRT: premarin x5 years post TABBY, tamoxifen x 5 years\par  \par  BRCA 2 positive [de-identified] : Patient seen and examined and here today for follow up stopped tamoxifen and verzenio on sunday due to fatigue, constipation and weight gain.  unable to go to the gym bc of fatgiue trying linzess and other constipation medications for constipation Head CT performed at [facility] on 3/5 - all normal saw Guerda Mario

## 2024-06-26 NOTE — ASSESSMENT
[FreeTextEntry1] : #Breast cancer 2013- left breast upper outer IDC; s/p partial mastectomy + sentinel lymph node biopsy Dr. Jo ([place]) 1.6cm moderately differentiated IDC with 2 negative nodes, triple positive, s/p taxol + herceptin x12 months Dr. Cotto ([facility] She refused XRT and endocrine therapy and eventually developed recurrence in the left breast upper outer quadrant 2016 ER/MD positive, HER 2 equivocal by IHC, negative by FISH; BRCA positive with 6174 deletion T mutation s/p bilateral nipple sparing mastectomies, repeat left sentinel node biopsy, with direct to implant reconstruction Path: residual foci IDC, largest measuring 1cm and 2 negative nodes, oncotype 22 s/p CMF Dr. Carrera ([place]) 12/2016, patient not willing to take AI or tamoxifen and placed on toremifene s/p implant exchange Dr. Durán Patient refuses AI or Tamoxifen. She has been on Tormefine in the past. We have discussed that this is typically not the medication we use in the adjuvant setting and that this is used in the metastatic setting. We have also discussed that this increases her risk of VTE and TIA/stroke. She understands her risk and would still like to proceed. I have advised that she remain on Eliquis while she is on this medication. I do believe that given her history of ER+ breast cancer that she would benefit from Endocrine therapy and she completely understands the risks but wants to proceed with Tormefine. 7/6/23 - vs reviewed. labs drawn in office today. Reviewed PET report and final pathology showed a total of 7 out of 17 positive lower left axillary lymph nodes and there was extranodal extension found in 5 of the positive nodes with lymphovascular invasion present in the accompanying fatty axillary tissue. We discussed the recommendation of chemotherapy with ddAC -->T vs TC. She is concerned about permanent alopecia associated with Taxotere. Reviewed side effect profile of dd AC --> T. Adriamycin 60 mg/m2 and cytoxan 600 mg/m2 q 2 weeks for 4 cycles with onpro for bone marrow convalescence. This will be followed by Taxol 80 mg/m2 weekly x 12 doses. We have reviewed the side effects to include but are not limited to cytopenias with increased risk of infection, sepsis and even death, N/V, Fatigue, alopecia, <1% chance of leukemia in the future and neuropathy. Since AC is high emetogenic risk will give Aloxi 0.25 mg IV, Emend 150 mg IV and decadron 10 mg IV with each cycle. will need follow up with Dr. Zamorano Then would recommend verzenio + tamoxifen as patient has osteoporosis and does not want bisphosphonate therapy or olaparib given BRCA2 mutation. I would consider changing Tormifene to another endocrine therapy either an AI or Fulvestrant. She is concerned about the side effect profile of AIs. She is wanting to try Tamoxifen. If she does this she will need to come off wellbutrin in the future and change over to citalopram, escitalopram or venlafaxine. She will talk to her therapist about this. 7/31/23 - vs reviewed. Had port placed today. Reviewed echo - EF wnl. Acceptable for treatment. CT chest stable lung nodules - no evidence of recurrent malignancy. Reviewed side effect profile again of chemo. All questions have been addressed and answered to patients satisfaction. She is seeing the dentist for evaluation and will call when ready for chemo. 8/31/23 - vs and labs reviewed. labs drawn in office today. wbc, hgb and plts wnl. slightly elevated ALKP - monitor otherwise kidney function and liver function wnl. proceed with cycle 2 of ddAC with onpro. 9/14/23 - vs reviewed. labs drawn in office today. wbc 12.77, hgb 12.9, plts 174. elevated ALKP 152 - likely due to chemo. electrolytes wnl, otherwise kidney function and liver function wnl. 9/28/23 - vs reviewed. labs drawn in office today. wbc 16 - likely due to onpro. monitor. hgb 11.7 - monitor and plts wnl. K low - she will increase K rich foods. ALKP 170 - likely due to chemo. monitor. proceed with cycle 4 of ddAC with onpro 10/12/23 vs reviewed. labs drawn in office today, wbc 18.9, hgb wnl, plts wnl. proceed with taxol. 10/19/23 - vs reviewed. labs drawn in office today. wbc wnl, plts wnl. hgb 10.5 - minitor - likely due to chemo. K 3.3 - will replace otherwise liverfunction and kidney function ok to proceed with treatment. proceed with taxol 10/26/23 - vs reviewed. labs drawn in office today. wbc and plts wnl. hgb 10.3 - monitor - due to chemo. electrolytes, kidney function and liver function wnl. ok to proceed with treatment. proceed with taxol #3. 11/2/23 - vs reviewed. labs drawn in office today. wbc 4.98, hgb 10.2, plt 300. elevated ALT 48 - monitor. ok to proceed with treatment. proceed with taxol #4. She does not want Radiation but has agreed to have a conversation with Dr. Dawson, Radiation oncology. She is concerned about the side effect profile of AIs. She is wanting to try Tamoxifen after chemo. If she does this she will need to come off wellbutrin in the future and change over to citalopram, escitalopram or venlafaxine. She will talk to Dr. Pierce. She is concerned about the side effect profile of verzenio or olaparib affecting her quality of life. She will think about these medications and read more about them. 11/9/23 - vs and labs reviewed. labs drawn in office today. wbc 4.1, hgb 11.1, plts 212 - ok to proceed with taxol #5. will have her see Dr. Dawson as she gets closer to cycle 12. she is willing to take verzenio after chemo however she wants to start at 100 mg BID. I am ok with this as I have previously seen patients with extensive side effects at 150 mg BID and I believe this patient would stop treatment if she has significant side effects. Again she is not willing to try an AI. She will start on Tamoxife as well. 11/16/23 vs and labs reviewed. labs drawn in office today. wbc 4.35, hgb 10.8, plts 198. ok to proceed with cycle 6. elevation in ALT/AST likely due to taxol - will monitor. She is not coming next Thursday due to Thanksgiving. She will proceed the next Thursday - I would like to see if ALT/AST downtrends with the week off. If no improvement will image liver. 11/30/23 - vs and labs reviewed. labs drawn in office today. wbc 4.95, hgb 12, plts 214. ALT down trending, AST wnl. Ca+, electrolytes and kidney function wnl. proceed with cycle 7 today. I will refer her to RT to schedule an appt to discuss RT with Dr. Dawson 12/7/23 - vs and labs reviewed. labs drawn in office today. wbc 4.45, hgb 11.7, plts wnl, CMP reviewed ALT 39 -likely due to chemo - monitor. proceed with cycle 8 today. She is discussing with Dr. Pierce about weaning off wellbutrin and Dr. Pierce is looking into the literature regarding tamoxifen and wellbutrin 12/14/23 - vs and labs reviewed. labs drawn in office today. wbc 4.45, hgb 11.7, plts wnl, CMP reviewed ALT 39 -likely due to chemo - monitor. proceed with cycle 9 today. Reached out to Radiation for them to reach out to patient to discuss role of RT.  12/28/23 0 vs and labs reviewed. labs drawn in office today. wbc 4.47, hgb 12.8, plts wnl, CMP reviewed ALT 35 -likely due to chemo - monitor. proceed with cycle 11 today. Last imaging in regards to her cancers was 7/23. recommend PET CT 1/24 to make sure there is no distant disease prior to starting verzenio and tamoxifen. She will also have the discussion with Dr. Dawson  1/4/24 - vs and labs reviewed. labs drawn in office today. completing taxol 12 cycles. Plan for PET CT. Did not have eval with radiation here. Recommend verzenio and tamoxifen  1/31/24 - vs and labs reviewed. pet ct reviewed from 1/24/24 -  Since PET/CT from 5/2/2023, the FDG avid left axillary lymph node has been removed. There is no residual lymph node FDG avidity or lymphadenopathy. FDG avid focus in the right eighth rib fracture. Mildly avid right posterior residual upper lobe fibrosis and decreased avidity of right lung apical nodule, likely due to radiation treatment. She continues to not want radiation thus she will start tamoxifen 20 mg daily. She will start verzenio as well however she is concerned about side effects thus would recommend starting slowly. will have her take verzenio 100 mg daily and will slowly increase her to 2 pills daily 3/20/24 - vs and labs reviewed. labs drawn in office today. continue tamoxifen and verzenio 100 mg BID. 4/24/24 - vs and labs reviewed. labs drawn in office today. continue tamoxifen and verzenio 100 mg BID. overall tolerating well. sees Dr. Croft this week.  plan for CT Chest - FDG avid focus in the right eighth rib fracture. Mildly avid right posterior residual upper lobe fibrosis and decreased avidity of right lung apical nodule, likely due to radiation treatment. 6/26/24 - vs reviewed. labs drawn in office today. CT chest - Stable postsurgical changes and thickening in the right apex. Stable subcentimeter pulmonary nodules. No definite new or enlarging pulmonary nodules or consolidations.  stopped tamoxifen and verzenio on sunday due to fatigue, constipation and weight gain. will try tamoxifen 10 mg daily and continue verzenio 100 mg BID for now. She will call me if there is further concern.  #BRCA 2 PET from 1/24 showed no evidence of pancreatic uptake.  Breast MRI -MRI breast 10/22 - reviewed- no evidence of malignancy. Derm - follow up with Dr. Acosta or Dr. Ruffin S/p mastectomy and TAHBSO  #lung cancer with recurrence s/p SBRT reviewed PET CT 1/24- Mildly avid right posterior residual upper lobe fibrosis and decreased avidity of right lung apical nodule, likely due to radiation treatment 6/24 -  CT chest - Stable postsurgical changes and thickening in the right apex. Stable subcentimeter pulmonary nodules. No definite new or enlarging pulmonary nodules or consolidations  # TIA likely 2/2 afib and aneurysm on eliquis  #osteoporosis continue vit D stopped ca++ due to constipation does not want bisphosphonates (oral, SQ or IV) but continue to discuss this  #constipation recommend GI follow up on mag citrate and linzess. may have element of gastroparesis but will hold on reglan for now encourage hydration and movement  #anxiety on lorazepam - she takes this during the day and then she takes xanax at night to sleep because it is more effective   Follow in 8 weeks with cbc with diff, cmp, LDH, ESR/CRP, mag, iron, ferritin, b12, folate, magnesium

## 2024-06-26 NOTE — REVIEW OF SYSTEMS
[Fever] : fever [Chills] : chills [Night Sweats] : night sweats [Fatigue] : fatigue [Recent Change In Weight] : ~T recent weight change [Chest Pain] : chest pain [SOB on Exertion] : shortness of breath during exertion [Abdominal Pain] : abdominal pain [Constipation] : constipation [Joint Pain] : joint pain [Joint Stiffness] : joint stiffness [Insomnia] : insomnia [Anxiety] : anxiety [Negative] : Allergic/Immunologic [FreeTextEntry4] : constant dry mouth; runny nose for weeks before having a cold [FreeTextEntry5] : intermittent chest pain for the past few weeks [FreeTextEntry9] : stiffness and pain in bilateral knees for the last few weeks, aching bones [de-identified] : PTSD

## 2024-06-26 NOTE — CONSULT LETTER
[Dear  ___] : Dear  [unfilled], [Consult Letter:] : I had the pleasure of evaluating your patient, [unfilled]. [Please see my note below.] : Please see my note below. [Consult Closing:] : Thank you very much for allowing me to participate in the care of this patient.  If you have any questions, please do not hesitate to contact me. [Sincerely,] : Sincerely, [FreeTextEntry3] : Heidi Gregory DO, FACJOSELITO, FACP\par  Medical Oncology and Hematology\par  [facility] [place]\par

## 2024-08-28 ENCOUNTER — RESULT REVIEW (OUTPATIENT)
Age: 64
End: 2024-08-28

## 2024-08-28 ENCOUNTER — NON-APPOINTMENT (OUTPATIENT)
Age: 64
End: 2024-08-28

## 2024-08-28 ENCOUNTER — APPOINTMENT (OUTPATIENT)
Dept: HEMATOLOGY ONCOLOGY | Facility: CLINIC | Age: 64
End: 2024-08-28
Payer: COMMERCIAL

## 2024-08-28 VITALS
SYSTOLIC BLOOD PRESSURE: 112 MMHG | HEART RATE: 75 BPM | BODY MASS INDEX: 26.8 KG/M2 | TEMPERATURE: 97.6 F | HEIGHT: 64 IN | RESPIRATION RATE: 16 BRPM | DIASTOLIC BLOOD PRESSURE: 56 MMHG | OXYGEN SATURATION: 99 % | WEIGHT: 157 LBS

## 2024-08-28 DIAGNOSIS — Z90.13 ACQUIRED ABSENCE OF BILATERAL BREASTS AND NIPPLES: ICD-10-CM

## 2024-08-28 DIAGNOSIS — Z15.01 GENETIC SUSCEPTIBILITY TO MALIGNANT NEOPLASM OF BREAST: ICD-10-CM

## 2024-08-28 DIAGNOSIS — Z17.0 MALIGNANT NEOPLASM OF OVERLAPPING SITES OF UNSPECIFIED FEMALE BREAST: ICD-10-CM

## 2024-08-28 DIAGNOSIS — C34.90 MALIGNANT NEOPLASM OF UNSPECIFIED PART OF UNSPECIFIED BRONCHUS OR LUNG: ICD-10-CM

## 2024-08-28 DIAGNOSIS — C50.819 MALIGNANT NEOPLASM OF OVERLAPPING SITES OF UNSPECIFIED FEMALE BREAST: ICD-10-CM

## 2024-08-28 DIAGNOSIS — Z15.09 GENETIC SUSCEPTIBILITY TO MALIGNANT NEOPLASM OF BREAST: ICD-10-CM

## 2024-08-28 DIAGNOSIS — Z17.0 ESTROGEN RECEPTOR POSITIVE STATUS [ER+]: ICD-10-CM

## 2024-08-28 PROCEDURE — 99214 OFFICE O/P EST MOD 30 MIN: CPT

## 2024-08-28 PROCEDURE — 36415 COLL VENOUS BLD VENIPUNCTURE: CPT

## 2024-08-28 NOTE — HISTORY OF PRESENT ILLNESS
[de-identified] : Ms. Roman is a 61 year old woman who presents for endocrine therapy consultation. Patient is on toremifene because she takes wellbutrin which is contraindicated with tamoxifen. Patient is seeing us today because her current oncologist does not want her on toremifene and wants to place her on an AI but patient is refusing. Patient states that last week she experienced her first migraine since her brain surgery and states that she left a note on her night stand for her daughter because the patient did not believe she would wake up. Patient states that she has PTSD due to her medical history. Patients goal is to be treated by Dr. Gregory Because she is passionate about her quality of life over her quantity. She wishes to be viewed as a whole person and is advocating to be placed back on toremifene. \par  \par  DEXA shows osteopenia- patient denied Fosamax \par  \par  Patient had a tumor removed from her chest cavity at 15. Cervical Ca dx 10 weeks into her 1st pregnancy at 34 years old. \par  \par  2013- left breast upper outer IDC; s/p partial mastectomy + sentinel lymph node biopsy Dr. Jo ([place])\par  1.6cm moderately differentiated IDC with 2 negative nodes, triple positive, s/p taxol + [place] [place]  She refused XRT and endocrine therapy and eventually developed recurrence in the left breast upper outer quadrant\par  \par  2016 ER/DC positive, HER 2 equivical by IHC, negative by FISH; BRCA positive with 6174 deletion T mutation\par  s/p bilateral nipple sparing mastectomies, repeat left sentinel node biopsy, with direct to implant reconstruction\par  Path: residual foci IDC, largest measuring 1cm and 2 negative nodes, oncotype 22\par  s/p CMF Dr. Carrera ([place]) 12/2016,  patient not willing to take AI or tamoxifen and placed on toremifene \par  s/p implant exchange Dr. Durán \par  \par  2020 s/p RUL resection Dr. Zacarias Frye\par  \par  10/2021 admitted for TIA and vertigo given TPA but found to have brain aneurysm; placed on eliquis\par  \par  2/2022 brain aneurysm clipping at [facility] followed by Dr. 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 Croft On tamoxifen (lower dose) and verzenio, but still complains of fatigue, constipation and weight gain.  trying linzess and other constipation medications for constipation Seeing Dr. Croft in 10/11/24.

## 2024-08-28 NOTE — REVIEW OF SYSTEMS
[Fever] : fever [Chills] : chills [Night Sweats] : night sweats [Fatigue] : fatigue [Recent Change In Weight] : ~T recent weight change [Chest Pain] : chest pain [SOB on Exertion] : shortness of breath during exertion [Abdominal Pain] : abdominal pain [Constipation] : constipation [Joint Pain] : joint pain [Joint Stiffness] : joint stiffness [Insomnia] : insomnia [Anxiety] : anxiety [Negative] : Respiratory [FreeTextEntry4] : constant dry mouth; runny nose  [FreeTextEntry5] : intermittent chest pain  [FreeTextEntry9] : stiffness and pain in bilateral knees for the last few weeks, aching bones [de-identified] : PTSD

## 2024-08-28 NOTE — CONSULT LETTER
[Dear  ___] : Dear  [unfilled], [Consult Letter:] : I had the pleasure of evaluating your patient, [unfilled]. [Please see my note below.] : Please see my note below. [Consult Closing:] : Thank you very much for allowing me to participate in the care of this patient.  If you have any questions, please do not hesitate to contact me. [Sincerely,] : Sincerely, [FreeTextEntry3] : Heidi Gregory DO, FACJOSELITO, FACP\par  Medical Oncology and Hematology\par  [facility] [place]\par

## 2024-08-28 NOTE — ASSESSMENT
[FreeTextEntry1] : #Breast cancer 2013- left breast upper outer IDC; s/p partial mastectomy + sentinel lymph node biopsy Dr. Jo ([place]) 1.6cm moderately differentiated IDC with 2 negative nodes, triple positive, s/p taxol + herceptin x12 months Dr. Cotto [place] [place] She refused XRT and endocrine therapy and eventually developed recurrence in the left breast upper outer quadrant 2016 ER/VA positive, HER 2 equivocal by IHC, negative by FISH; BRCA positive with 6174 deletion T mutation s/p bilateral nipple sparing mastectomies, repeat left sentinel node biopsy, with direct to implant reconstruction Path: residual foci IDC, largest measuring 1cm and 2 negative nodes, oncotype 22 s/p CMF Dr. Carrera ([place]) 12/2016, patient not willing to take AI or tamoxifen and placed on toremifene s/p implant exchange Dr. Durán Patient refuses AI or Tamoxifen. She has been on Tormefine in the past. We have discussed that this is typically not the medication we use in the adjuvant setting and that this is used in the metastatic setting. We have also discussed that this increases her risk of VTE and TIA/stroke. She understands her risk and would still like to proceed. I have advised that she remain on Eliquis while she is on this medication. I do believe that given her history of ER+ breast cancer that she would benefit from Endocrine therapy and she completely understands the risks but wants to proceed with Tormefine. 7/6/23 - vs reviewed. labs drawn in office today. Reviewed PET report and final pathology showed a total of 7 out of 17 positive lower left axillary lymph nodes and there was extranodal extension found in 5 of the positive nodes with lymphovascular invasion present in the accompanying fatty axillary tissue. We discussed the recommendation of chemotherapy with ddAC -->T vs TC. She is concerned about permanent alopecia associated with Taxotere. Reviewed side effect profile of dd AC --> T. Adriamycin 60 mg/m2 and cytoxan 600 mg/m2 q 2 weeks for 4 cycles with onpro for bone marrow convalescence. This will be followed by Taxol 80 mg/m2 weekly x 12 doses. We have reviewed the side effects to include but are not limited to cytopenias with increased risk of infection, sepsis and even death, N/V, Fatigue, alopecia, <1% chance of leukemia in the future and neuropathy. Since AC is high emetogenic risk will give Aloxi 0.25 mg IV, Emend 150 mg IV and decadron 10 mg IV with each cycle. will need follow up with Dr. Zamorano Then would recommend verzenio + tamoxifen as patient has osteoporosis and does not want bisphosphonate therapy or olaparib given BRCA2 mutation. I would consider changing Tormifene to another endocrine therapy either an AI or Fulvestrant. She is concerned about the side effect profile of AIs. She is wanting to try Tamoxifen. If she does this she will need to come off wellbutrin in the future and change over to citalopram, escitalopram or venlafaxine. She will talk to her therapist about this. 7/31/23 - vs reviewed. Had port placed today. Reviewed echo - EF wnl. Acceptable for treatment. CT chest stable lung nodules - no evidence of recurrent malignancy. Reviewed side effect profile again of chemo. All questions have been addressed and answered to patients satisfaction. She is seeing the dentist for evaluation and will call when ready for chemo. 8/31/23 - vs and labs reviewed. labs drawn in office today. wbc, hgb and plts wnl. slightly elevated ALKP - monitor otherwise kidney function and liver function wnl. proceed with cycle 2 of ddAC with onpro. 9/14/23 - vs reviewed. labs drawn in office today. wbc 12.77, hgb 12.9, plts 174. elevated ALKP 152 - likely due to chemo. electrolytes wnl, otherwise kidney function and liver function wnl. 9/28/23 - vs reviewed. labs drawn in office today. wbc 16 - likely due to onpro. monitor. hgb 11.7 - monitor and plts wnl. K low - she will increase K rich foods. ALKP 170 - likely due to chemo. monitor. proceed with cycle 4 of ddAC with onpro 10/12/23 vs reviewed. labs drawn in office today, wbc 18.9, hgb wnl, plts wnl. proceed with taxol. 10/19/23 - vs reviewed. labs drawn in office today. wbc wnl, plts wnl. hgb 10.5 - minitor - likely due to chemo. K 3.3 - will replace otherwise liverfunction and kidney function ok to proceed with treatment. proceed with taxol 10/26/23 - vs reviewed. labs drawn in office today. wbc and plts wnl. hgb 10.3 - monitor - due to chemo. electrolytes, kidney function and liver function wnl. ok to proceed with treatment. proceed with taxol #3. 11/2/23 - vs reviewed. labs drawn in office today. wbc 4.98, hgb 10.2, plt 300. elevated ALT 48 - monitor. ok to proceed with treatment. proceed with taxol #4. She does not want Radiation but has agreed to have a conversation with Dr. Dawson, Radiation oncology. She is concerned about the side effect profile of AIs. She is wanting to try Tamoxifen after chemo. If she does this she will need to come off wellbutrin in the future and change over to citalopram, escitalopram or venlafaxine. She will talk to Dr. Pierce. She is concerned about the side effect profile of verzenio or olaparib affecting her quality of life. She will think about these medications and read more about them. 11/9/23 - vs and labs reviewed. labs drawn in office today. wbc 4.1, hgb 11.1, plts 212 - ok to proceed with taxol #5. will have her see Dr. Dawson as she gets closer to cycle 12. she is willing to take verzenio after chemo however she wants to start at 100 mg BID. I am ok with this as I have previously seen patients with extensive side effects at 150 mg BID and I believe this patient would stop treatment if she has significant side effects. Again she is not willing to try an AI. She will start on Tamoxife as well. 11/16/23 vs and labs reviewed. labs drawn in office today. wbc 4.35, hgb 10.8, plts 198. ok to proceed with cycle 6. elevation in ALT/AST likely due to taxol - will monitor. She is not coming next Thursday due to Thanksgiving. She will proceed the next Thursday - I would like to see if ALT/AST downtrends with the week off. If no improvement will image liver. 11/30/23 - vs and labs reviewed. labs drawn in office today. wbc 4.95, hgb 12, plts 214. ALT down trending, AST wnl. Ca+, electrolytes and kidney function wnl. proceed with cycle 7 today. I will refer her to RT to schedule an appt to discuss RT with Dr. Dawson 12/7/23 - vs and labs reviewed. labs drawn in office today. wbc 4.45, hgb 11.7, plts wnl, CMP reviewed ALT 39 -likely due to chemo - monitor. proceed with cycle 8 today. She is discussing with Dr. Pierce about weaning off wellbutrin and Dr. Peirce is looking into the literature regarding tamoxifen and wellbutrin 12/14/23 - vs and labs reviewed. labs drawn in office today. wbc 4.45, hgb 11.7, plts wnl, CMP reviewed ALT 39 -likely due to chemo - monitor. proceed with cycle 9 today. Reached out to Radiation for them to reach out to patient to discuss role of RT.  12/28/23 0 vs and labs reviewed. labs drawn in office today. wbc 4.47, hgb 12.8, plts wnl, CMP reviewed ALT 35 -likely due to chemo - monitor. proceed with cycle 11 today. Last imaging in regards to her cancers was 7/23. recommend PET CT 1/24 to make sure there is no distant disease prior to starting verzenio and tamoxifen. She will also have the discussion with Dr. Dawson  1/4/24 - vs and labs reviewed. labs drawn in office today. completing taxol 12 cycles. Plan for PET CT. Did not have eval with radiation here. Recommend verzenio and tamoxifen  1/31/24 - vs and labs reviewed. pet ct reviewed from 1/24/24 -  Since PET/CT from 5/2/2023, the FDG avid left axillary lymph node has been removed. There is no residual lymph node FDG avidity or lymphadenopathy. FDG avid focus in the right eighth rib fracture. Mildly avid right posterior residual upper lobe fibrosis and decreased avidity of right lung apical nodule, likely due to radiation treatment. She continues to not want radiation thus she will start tamoxifen 20 mg daily. She will start verzenio as well however she is concerned about side effects thus would recommend starting slowly. will have her take verzenio 100 mg daily and will slowly increase her to 2 pills daily 3/20/24 - vs and labs reviewed. labs drawn in office today. continue tamoxifen and verzenio 100 mg BID. 4/24/24 - vs and labs reviewed. labs drawn in office today. continue tamoxifen and verzenio 100 mg BID. overall tolerating well. sees Dr. Croft this week.  plan for CT Chest - FDG avid focus in the right eighth rib fracture. Mildly avid right posterior residual upper lobe fibrosis and decreased avidity of right lung apical nodule, likely due to radiation treatment. 6/26/24 - vs reviewed. labs drawn in office today. CT chest - Stable postsurgical changes and thickening in the right apex. Stable subcentimeter pulmonary nodules. No definite new or enlarging pulmonary nodules or consolidations.  stopped tamoxifen and verzenio on sunday due to fatigue, constipation and weight gain. will try tamoxifen 10 mg daily and continue verzenio 100 mg BID for now. She will call me if there is further concern. 8/28/24 - vs and labs reviewed. labs drawn in office today. wbc, hgb and plts wnl. continue with tamoxifen 10 mg daily and continue verzenio 100 mg BID for now. Seeing Dr. Croft 10/11/24.  #BRCA 2 PET from 1/24 showed no evidence of pancreatic uptake.  Breast MRI -MRI breast 10/22 - reviewed- no evidence of malignancy. Derm - follow up with Dr. Acosta or Dr. Ruffin S/p mastectomy and TAHBSO  #lung cancer with recurrence s/p SBRT reviewed PET CT 1/24- Mildly avid right posterior residual upper lobe fibrosis and decreased avidity of right lung apical nodule, likely due to radiation treatment 6/24 -  CT chest - Stable postsurgical changes and thickening in the right apex. Stable subcentimeter pulmonary nodules. No definite new or enlarging pulmonary nodules or consolidations  # TIA likely 2/2 afib and aneurysm on eliquis  #osteoporosis continue vit D stopped ca++ due to constipation does not want bisphosphonates (oral, SQ or IV) but continue to discuss this  #constipation recommend GI follow up on mag citrate and linzess. may have element of gastroparesis but will hold on reglan for now encourage hydration and movement  #anxiety on lorazepam - she takes this during the day and then she takes xanax at night to sleep because it is more effective   Follow in 8 weeks with cbc with diff, cmp, LDH, ESR/CRP, mag, iron, ferritin, b12, folate, magnesium

## 2024-08-28 NOTE — ASSESSMENT
[FreeTextEntry1] : #Breast cancer 2013- left breast upper outer IDC; s/p partial mastectomy + sentinel lymph node biopsy Dr. Jo ([place]) 1.6cm moderately differentiated IDC with 2 negative nodes, triple positive, s/p taxol + herceptin x12 months [place] [place] refused XRT and endocrine therapy and eventually developed recurrence in the left breast upper outer quadrant 2016 ER/ID positive, HER 2 equivocal by IHC, negative by FISH; BRCA positive with 6174 deletion T mutation s/p bilateral nipple sparing mastectomies, repeat left sentinel node biopsy, with direct to implant reconstruction Path: residual foci IDC, largest measuring 1cm and 2 negative nodes, oncotype 22 s/p CMF Dr. Carrera ([place]) 12/2016, patient not willing to take AI or tamoxifen and placed on toremifene s/p implant exchange Dr. Durán Patient refuses AI or Tamoxifen. She has been on Tormefine in the past. We have discussed that this is typically not the medication we use in the adjuvant setting and that this is used in the metastatic setting. We have also discussed that this increases her risk of VTE and TIA/stroke. She understands her risk and would still like to proceed. I have advised that she remain on Eliquis while she is on this medication. I do believe that given her history of ER+ breast cancer that she would benefit from Endocrine therapy and she completely understands the risks but wants to proceed with Tormefine. 7/6/23 - vs reviewed. labs drawn in office today. Reviewed PET report and final pathology showed a total of 7 out of 17 positive lower left axillary lymph nodes and there was extranodal extension found in 5 of the positive nodes with lymphovascular invasion present in the accompanying fatty axillary tissue. We discussed the recommendation of chemotherapy with ddAC -->T vs TC. She is concerned about permanent alopecia associated with Taxotere. Reviewed side effect profile of dd AC --> T. Adriamycin 60 mg/m2 and cytoxan 600 mg/m2 q 2 weeks for 4 cycles with onpro for bone marrow convalescence. This will be followed by Taxol 80 mg/m2 weekly x 12 doses. We have reviewed the side effects to include but are not limited to cytopenias with increased risk of infection, sepsis and even death, N/V, Fatigue, alopecia, <1% chance of leukemia in the future and neuropathy. Since AC is high emetogenic risk will give Aloxi 0.25 mg IV, Emend 150 mg IV and decadron 10 mg IV with each cycle. will need follow up with Dr. Zamorano Then would recommend verzenio + tamoxifen as patient has osteoporosis and does not want bisphosphonate therapy or olaparib given BRCA2 mutation. I would consider changing Tormifene to another endocrine therapy either an AI or Fulvestrant. She is concerned about the side effect profile of AIs. She is wanting to try Tamoxifen. If she does this she will need to come off wellbutrin in the future and change over to citalopram, escitalopram or venlafaxine. She will talk to her therapist about this. 7/31/23 - vs reviewed. Had port placed today. Reviewed echo - EF wnl. Acceptable for treatment. CT chest stable lung nodules - no evidence of recurrent malignancy. Reviewed side effect profile again of chemo. All questions have been addressed and answered to patients satisfaction. She is seeing the dentist for evaluation and will call when ready for chemo. 8/31/23 - vs and labs reviewed. labs drawn in office today. wbc, hgb and plts wnl. slightly elevated ALKP - monitor otherwise kidney function and liver function wnl. proceed with cycle 2 of ddAC with onpro. 9/14/23 - vs reviewed. labs drawn in office today. wbc 12.77, hgb 12.9, plts 174. elevated ALKP 152 - likely due to chemo. electrolytes wnl, otherwise kidney function and liver function wnl. 9/28/23 - vs reviewed. labs drawn in office today. wbc 16 - likely due to onpro. monitor. hgb 11.7 - monitor and plts wnl. K low - she will increase K rich foods. ALKP 170 - likely due to chemo. monitor. proceed with cycle 4 of ddAC with onpro 10/12/23 vs reviewed. labs drawn in office today, wbc 18.9, hgb wnl, plts wnl. proceed with taxol. 10/19/23 - vs reviewed. labs drawn in office today. wbc wnl, plts wnl. hgb 10.5 - minitor - likely due to chemo. K 3.3 - will replace otherwise liverfunction and kidney function ok to proceed with treatment. proceed with taxol 10/26/23 - vs reviewed. labs drawn in office today. wbc and plts wnl. hgb 10.3 - monitor - due to chemo. electrolytes, kidney function and liver function wnl. ok to proceed with treatment. proceed with taxol #3. 11/2/23 - vs reviewed. labs drawn in office today. wbc 4.98, hgb 10.2, plt 300. elevated ALT 48 - monitor. ok to proceed with treatment. proceed with taxol #4. She does not want Radiation but has agreed to have a conversation with Dr. Dawson, Radiation oncology. She is concerned about the side effect profile of AIs. She is wanting to try Tamoxifen after chemo. If she does this she will need to come off wellbutrin in the future and change over to citalopram, escitalopram or venlafaxine. She will talk to Dr. Pierce. She is concerned about the side effect profile of verzenio or olaparib affecting her quality of life. She will think about these medications and read more about them. 11/9/23 - vs and labs reviewed. labs drawn in office today. wbc 4.1, hgb 11.1, plts 212 - ok to proceed with taxol #5. will have her see Dr. Dawson as she gets closer to cycle 12. she is willing to take verzenio after chemo however she wants to start at 100 mg BID. I am ok with this as I have previously seen patients with extensive side effects at 150 mg BID and I believe this patient would stop treatment if she has significant side effects. Again she is not willing to try an AI. She will start on Tamoxife as well. 11/16/23 vs and labs reviewed. labs drawn in office today. wbc 4.35, hgb 10.8, plts 198. ok to proceed with cycle 6. elevation in ALT/AST likely due to taxol - will monitor. She is not coming next Thursday due to Thanksgiving. She will proceed the next Thursday - I would like to see if ALT/AST downtrends with the week off. If no improvement will image liver. 11/30/23 - vs and labs reviewed. labs drawn in office today. wbc 4.95, hgb 12, plts 214. ALT down trending, AST wnl. Ca+, electrolytes and kidney function wnl. proceed with cycle 7 today. I will refer her to RT to schedule an appt to discuss RT with Dr. Dawson 12/7/23 - vs and labs reviewed. labs drawn in office today. wbc 4.45, hgb 11.7, plts wnl, CMP reviewed ALT 39 -likely due to chemo - monitor. proceed with cycle 8 today. She is discussing with Dr. Pierce about weaning off wellbutrin and Dr. Pierce is looking into the literature regarding tamoxifen and wellbutrin 12/14/23 - vs and labs reviewed. labs drawn in office today. wbc 4.45, hgb 11.7, plts wnl, CMP reviewed ALT 39 -likely due to chemo - monitor. proceed with cycle 9 today. Reached out to Radiation for them to reach out to patient to discuss role of RT.  12/28/23 0 vs and labs reviewed. labs drawn in office today. wbc 4.47, hgb 12.8, plts wnl, CMP reviewed ALT 35 -likely due to chemo - monitor. proceed with cycle 11 today. Last imaging in regards to her cancers was 7/23. recommend PET CT 1/24 to make sure there is no distant disease prior to starting verzenio and tamoxifen. She will also have the discussion with Dr. Dawson  1/4/24 - vs and labs reviewed. labs drawn in office today. completing taxol 12 cycles. Plan for PET CT. Did not have eval with radiation here. Recommend verzenio and tamoxifen  1/31/24 - vs and labs reviewed. pet ct reviewed from 1/24/24 -  Since PET/CT from 5/2/2023, the FDG avid left axillary lymph node has been removed. There is no residual lymph node FDG avidity or lymphadenopathy. FDG avid focus in the right eighth rib fracture. Mildly avid right posterior residual upper lobe fibrosis and decreased avidity of right lung apical nodule, likely due to radiation treatment. She continues to not want radiation thus she will start tamoxifen 20 mg daily. She will start verzenio as well however she is concerned about side effects thus would recommend starting slowly. will have her take verzenio 100 mg daily and will slowly increase her to 2 pills daily 3/20/24 - vs and labs reviewed. labs drawn in office today. continue tamoxifen and verzenio 100 mg BID. 4/24/24 - vs and labs reviewed. labs drawn in office today. continue tamoxifen and verzenio 100 mg BID. overall tolerating well. sees Dr. Croft this week.  plan for CT Chest - FDG avid focus in the right eighth rib fracture. Mildly avid right posterior residual upper lobe fibrosis and decreased avidity of right lung apical nodule, likely due to radiation treatment. 6/26/24 - vs reviewed. labs drawn in office today. CT chest - Stable postsurgical changes and thickening in the right apex. Stable subcentimeter pulmonary nodules. No definite new or enlarging pulmonary nodules or consolidations.  stopped tamoxifen and verzenio on sunday due to fatigue, constipation and weight gain. will try tamoxifen 10 mg daily and continue verzenio 100 mg BID for now. She will call me if there is further concern. 8/28/24 - vs and labs reviewed. labs drawn in office today. wbc, hgb and plts wnl. continue with tamoxifen 10 mg daily and continue verzenio 100 mg BID for now. Seeing Dr. Croft 10/11/24.  #BRCA 2 PET from 1/24 showed no evidence of pancreatic uptake.  Breast MRI -MRI breast 10/22 - reviewed- no evidence of malignancy. Derm - follow up with Dr. Acosta or Dr. Ruffin S/p mastectomy and TAHBSO  #lung cancer with recurrence s/p SBRT reviewed PET CT 1/24- Mildly avid right posterior residual upper lobe fibrosis and decreased avidity of right lung apical nodule, likely due to radiation treatment 6/24 -  CT chest - Stable postsurgical changes and thickening in the right apex. Stable subcentimeter pulmonary nodules. No definite new or enlarging pulmonary nodules or consolidations  # TIA likely 2/2 afib and aneurysm on eliquis  #osteoporosis continue vit D stopped ca++ due to constipation does not want bisphosphonates (oral, SQ or IV) but continue to discuss this  #constipation recommend GI follow up on mag citrate and linzess. may have element of gastroparesis but will hold on reglan for now encourage hydration and movement  #anxiety on lorazepam - she takes this during the day and then she takes xanax at night to sleep because it is more effective   Follow in 8 weeks with cbc with diff, cmp, LDH, ESR/CRP, mag, iron, ferritin, b12, folate, magnesium

## 2024-08-28 NOTE — HISTORY OF PRESENT ILLNESS
[de-identified] : Ms. Roman is a 61 year old woman who presents for endocrine therapy consultation. Patient is on toremifene because she takes wellbutrin which is contraindicated with tamoxifen. Patient is seeing us today because her current oncologist does not want her on toremifene and wants to place her on an AI but patient is refusing. Patient states that last week she experienced her first migraine since her brain surgery and states that she left a note on her night stand for her daughter because the patient did not believe she would wake up. Patient states that she has PTSD due to her medical history. Patients goal is to be treated by Dr. Gregory Because she is passionate about her quality of life over her quantity. She wishes to be viewed as a whole person and is advocating to be placed back on toremifene. \par  \par  DEXA shows osteopenia- patient denied Fosamax \par  \par  Patient had a tumor removed from her chest cavity at 15. Cervical Ca dx 10 weeks into her 1st pregnancy at 34 years old. \par  \par  2013- left breast upper outer IDC; s/p partial mastectomy + sentinel lymph node biopsy Dr. Jo ([place])\par  1.6cm moderately differentiated IDC with 2 negative nodes, triple positive, s/p taxol + herceptin x12 months Dr. Cotto ([facility] [place]par  She refused XRT and endocrine therapy and eventually developed recurrence in the left breast upper outer quadrant\par  \par  2016 ER/[place] positive, HER 2 equivical by IHC, negative by FISH; BRCA positive with 6174 deletion T mutation\par  s/p bilateral nipple sparing mastectomies, repeat left sentinel node biopsy, with direct to implant reconstruction\par  Path: residual foci IDC, largest measuring 1cm and 2 negative nodes, oncotype 22\par  s/p CMF Dr. Carrera ([place]) 12/2016,  patient not willing to take AI or tamoxifen and placed on toremifene \par  s/p implant exchange Dr. Durán \par  \par  2020 s/p RUL resection Dr. Zacarias Frye\par  \par  10/2021 admitted for TIA and vertigo given TPA but found to have brain aneurysm; placed on eliquis\par  \par  2/2022 brain aneurysm clipping at [facility] followed by Dr. 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 Croft On tamoxifen (lower dose) and verzenio, but still complains of fatigue, constipation and weight gain.  trying linzess and other constipation medications for constipation Seeing Dr. Croft in 10/11/24.

## 2024-08-28 NOTE — REVIEW OF SYSTEMS
[Fever] : fever [Chills] : chills [Night Sweats] : night sweats [Fatigue] : fatigue [Recent Change In Weight] : ~T recent weight change [Chest Pain] : chest pain [SOB on Exertion] : shortness of breath during exertion [Abdominal Pain] : abdominal pain [Constipation] : constipation [Joint Pain] : joint pain [Joint Stiffness] : joint stiffness [Insomnia] : insomnia [Anxiety] : anxiety [Negative] : Respiratory [FreeTextEntry4] : constant dry mouth; runny nose  [FreeTextEntry5] : intermittent chest pain  [FreeTextEntry9] : stiffness and pain in bilateral knees for the last few weeks, aching bones [de-identified] : PTSD

## 2024-08-28 NOTE — CONSULT LETTER
[Dear  ___] : Dear  [unfilled], [Consult Letter:] : I had the pleasure of evaluating your patient, [unfilled]. [Please see my note below.] : Please see my note below. [Consult Closing:] : Thank you very much for allowing me to participate in the care of this patient.  If you have any questions, please do not hesitate to contact me. [Sincerely,] : Sincerely, [FreeTextEntry3] : Heidi Gregory DO, FACJOSELITO, FACP\par  Medical Oncology and Hematology\par  [facility]\par

## 2024-09-21 ENCOUNTER — NON-APPOINTMENT (OUTPATIENT)
Age: 64
End: 2024-09-21

## 2024-09-21 NOTE — HISTORY OF PRESENT ILLNESS
[FreeTextEntry1] : The patient is a 64-year-old G1, P1 postmenopausal white female of Ashkenazi [employer] descent.  She underwent menarche at age 13 and had her first child at age 34.  She underwent menopause when she had a radical TABBY/BSO in  at the time of her  when she had a significant stage III cervical cancer.  She was on Premarin for 5 years after the TABBY/BSO and then took tamoxifen for 5 years after that.  She has a strong family history of breast and ovarian cancer with her mother who  of ovarian cancer at age 45 and her maternal aunt who  from metastatic breast cancer at age 50.  The patient herself was diagnosed with a left breast upper outer quadrant breast cancer in  and underwent a left breast partial mastectomy and sentinel lymph node biopsy by Dr. Ramos at [facility] on 2013 for 1.6 cm moderately differentiated invasive duct cancer with 2 negative sentinel lymph nodes which was ER/NC strongly positive and HER2 positive by FISH with a Ki-67 of 25%.  She had a FISH ratio of 2.3 and a total HER2 count of 7.4.  The patient underwent chemotherapy at [facility] through Dr. Cotto and had Taxol with a full year of Herceptin but she refused any radiation therapy or endocrine therapy.  She then had an abnormal MRI on May 18, 2016 with a suspicious mass in the wide excision site in the left breast 1:00 region which was also seen on ultrasound is an 8 mm density and ultrasound core biopsy on May 18, 2016 showed a recurrent moderately differentiated invasive duct cancer which was again ER/NC positive and HER2 equivocal by IHC but turned out to be negative by FISH with a ratio of 1.3 and total HER2 count of 3.5.  She then tested BRCA2 positive with a 6174 deletion T mutation.  PET scan at that time showed no distant disease.  A mammography showed some other calcifications in the left breast but the patient decided to undergo bilateral mastectomies with bilateral nipple sparing technique with a prophylactic right breast mastectomy and direct to implant reconstruction with a repeat left axillary sentinel lymph node biopsy which was performed on 2016.  Final pathology showed 2 negative sentinel lymph nodes in the right breast was negative.  The left breast did show some foci of invasive duct cancer measuring 1 cm, 3 mm, and 1 cm respectively.  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  Some of the cancer was found in detached fragments which was on the anterior margin underneath the previous wide excision site.  I removed the further anterior margin which was completely negative so the final margins were all negative.  Final pathology showed the cancer to be ER/NC positive HER2 equivocal by IHC with a Ki-67 of 25% with a FISH ratio of 1.3 and total HER2 count of 3.5.  The patient saw Dr. Carrera at [facility] and an Oncotype Dx recurrence score was 22.  She underwent CMF chemotherapy which finished on 2016.  She later had her implants exchanged for cosmetic reasons with textured implants by Dr. Durán.  She was placed on toremifene.  She then underwent a right upper lobe lung resection for lung cancer by Dr. Zacarias Bunch performed in .  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  In 2021, she was admitted with a TIA and vertigo and found to have a brain aneurysm.  She also had a short bout of A. fib and has been on Eliquis.  She had a brain aneurysm clipping at [facility] in 2022.  She had been seeing Dr. Watson at [facility] and has remained on toremifene since  and has now been following up with Dr. Gregory and Dr. Pascual and was advised to come off of the toremifene because of history of a TIA/stroke but the patient will only agree to a toremifene and has been kept on Eliquis while on the medication.  She has developed a recurrence along the staple line of her right lung cancer found on recent PET scan performed in May 2023 and CT scan performed in 2023.  She also been found to have some left axillary adenopathy which was hypermetabolic on PET scan and directed ultrasound and then ultrasound-guided core biopsy was performed on May 12, 2023 of this left lower axillary finding and this showed recurrent moderately differentiated invasive duct cancer with minimal lymphoid tissue and involving skeletal muscle and fibroadipose tissue which was ER positive between 91 and 100% and NC strongly positive between 81 and 90% and this was HER2 negative by FISH with a Ki-67 between 20 and 30%.  She underwent SBRT radiation for the lung recurrence and was seen by radiation oncology as well as medical oncology and was refusing any neoadjuvant chemotherapy or chest wall radiation.  Eventually, however the patient would except local excision of this chest wall density with a lower axilla lymph node dissection which was performed on 2023.  The final pathology showed this chest wall lesion to be a completely replaced axillary lymph node and the node dissection showed a total of 7 out of 17 positive lymph nodes with 5 of the 7 involved nodes with extranodal extension and there was lymphovascular invasion present in the surrounding adipose tissue.  This remained ER/NC strongly positive and HER2 negative with a high Ki-67 at 25%.  This was a recurrent pathologic prognostic stage IIB breast cancer but pathologic anatomic stage IIIA.  She was seen by Dr. Gregory and Dr. Watson for a medical oncology evaluation postoperatively and eventually agreed to chemotherapy and had a port placed and she finished dose dense AC and Taxol and olaparib was considered but never given.  She did start tamoxifen and low-dose Verzenio after chemotherapy.  She had initially agreed to regional left axillary and supraclavicular curtis irradiation but she was seen by Dr. Dawson in 2024 and she decided against radiation therapy given the risk of lymphedema.  She was also considering proton beam therapy.  She comes in now for routine follow-up.

## 2024-09-21 NOTE — PAST MEDICAL HISTORY
[Postmenopausal] : The patient is postmenopausal [Menarche Age ____] : age at menarche was [unfilled] [Menopause Age____] : age at menopause was [unfilled] [History of Hormone Replacement Treatment] : has no history of hormone replacement treatment [Total Preg ___] : G[unfilled] [Live Births ___] : P[unfilled]  [Age At Live Birth ___] : Age at live birth: [unfilled] [de-identified] : s/p TABBY/BSO for cervical cancer [id number]

## 2024-09-21 NOTE — REVIEW OF SYSTEMS
[Feeling Tired] : feeling tired [Palpitations] : palpitations [Depression] : depression [Negative] : Heme/Lymph [de-identified] : Sure of TIA/stroke with brain aneurysm clipping

## 2024-09-21 NOTE — ASSESSMENT
[FreeTextEntry1] : The patient is a 64-year-old G1, P1 postmenopausal white female of Ashken[place] descent.  She underwent menarche at age 13 and had her first child at age 34.  She underwent menopause when she had a radical TABBY/BSO in  at the time of her  when she had a significant stage III cervical cancer.  She was on Premarin for 5 years after the TABBY/BSO and then took tamoxifen for 5 years after that.  She has a strong family history of breast and ovarian cancer with her mother who  of ovarian cancer at age 45 and her maternal aunt who  from metastatic breast cancer at age 50.  The patient herself was diagnosed with a left breast upper outer quadrant breast cancer in  and underwent a left breast partial mastectomy and sentinel lymph node biopsy by Dr. Ramos at [facility] on 2013 for 1.6 cm moderately differentiated invasive duct cancer with 2 negative sentinel lymph nodes which was ER/MA strongly positive and HER2 positive by FISH with a Ki-67 of 25%.  She had a FISH ratio of 2.3 and a total HER2 count of 7.4.  The patient underwent chemotherapy at [facility] through Dr. Cotto and had Taxol with a full year of Herceptin but she refused any radiation therapy or endocrine therapy.  She then had an abnormal MRI on May 18, 2016 with a suspicious mass in the wide excision site in the left breast 1:00 region which was also seen on ultrasound as an 8 mm density and ultrasound core biopsy on May 18, 2016 showed a recurrent moderately differentiated invasive duct cancer which was again ER/MA positive and HER2 equivocal by IHC but turned out to be negative by FISH with a ratio of 1.3 and total HER2 count of 3.5.  She then tested BRCA2 positive with a 6174 deletion T mutation.  PET scan at that time showed no distant disease.  A mammography showed some other calcifications in the left breast but the patient decided to undergo bilateral mastectomies with bilateral nipple sparing technique with a prophylactic right breast mastectomy and direct to implant reconstruction with a repeat left axillary sentinel lymph node biopsy which was performed on 2016.  Final pathology showed 2 negative sentinel lymph nodes and the right breast was negative.  The left breast did show some foci of invasive duct cancer measuring 1 cm, 3 mm, and 1 cm respectively.  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  Some of the cancer was found in detached fragments which was on the anterior margin underneath the previous wide excision site.  I removed the further anterior margin which was completely negative so the final margins were all negative.  Final pathology showed the cancer to be ER/MA positive HER2 equivocal by IHC with a Ki-67 of 25% with a FISH ratio of 1.3 and total HER2 count of 3.5.  The patient saw Dr. Carrera at [facility] and an Oncotype Dx recurrence score was 22.  She underwent CMF chemotherapy which finished on 2016.  She later had her implants exchanged for cosmetic reasons with textured implants by Dr. Durán.  She was placed on toremifene.  She then underwent a right upper lobe lung resection for lung cancer by Dr. Zacarias Bunch performed in .  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  In 2021, she was admitted with a TIA and vertigo and found to have a brain aneurysm.  She also had a short bout of A. fib and has been on Eliquis.  She had a brain aneurysm clipping at [facility] in 2022.  She had been seeing Dr. Watson at [facility] and has remained on toremifene since  and has now been following up with Dr. Gregory and Dr. Pascual and was advised to come off of the toremifene because of history of a TIA/stroke but the patient will only agree to a toremifene and has been kept on Eliquis while on the medication.  She has developed a recurrence along the staple line of her right lung cancer found on recent PET scan performed in May 2023 and CT scan performed in 2023.  She also been found to have some left axillary adenopathy which was hypermetabolic on PET scan and directed ultrasound and then ultrasound-guided core biopsy was performed on May 12, 2023 of this left lower axillary finding and this showed recurrent moderately differentiated invasive duct cancer with minimal lymphoid tissue and involving skeletal muscle and fibroadipose tissue which was ER positive between 91 and 100% and MA strongly positive between 81 and 90% and this was HER2 negative by FISH with a Ki-67 between 20 and 30%.  She underwent SBRT radiation for the lung recurrence and was seen by radiation oncology as well as medical oncology and was refusing any neoadjuvant chemotherapy or chest wall radiation.  Eventually, however the patient would except local excision of this chest wall density with a lower axilla lymph node dissection which was performed on 2023.  The final pathology showed this chest wall lesion to be a completely replaced axillary lymph node and the node dissection showed a total of 7 out of 17 positive lymph nodes with 5 of the 7 involved nodes with extranodal extension and there was lymphovascular invasion present in the surrounding adipose tissue.  This remained ER/MA strongly positive and HER2 negative with a high Ki-67 at 25%.  This was a recurrent pathologic prognostic stage IIB breast cancer but pathologic anatomic stage IIIA.  She was seen by Dr. Gregory and Dr. Watson for a medical oncology evaluation postoperatively and eventually agreed to chemotherapy with Dr. Gregory and had a port placed and completed dose dense AC and Taxol and olaparib was considered but not given. She did except tamoxifen and low-dose Verzenio which was started after chemotherapy.  She had initially agreed to regional left axillary and supraclavicular curtis irradiation but she was seen by Dr. Dawson in 2024 and has not moved forward with radiation therapy despite its survival advantage because of an at least 25% risk of lymphedema.  On exam today, she healed well from her local chest wall excision and lower left axillary lymph node dissection and has good range of motion.  I cannot feel any evidence of recurrence over the left implant and no suspicious findings over her right implant.  She does have some mild edema of the skin of the left breast.  She was reassured that she is doing well but she was encouraged to move forward with radiation therapy but at this point it is unlikely she will accept radiation ???????.  She understands the benefits of tamoxifen and Verzenio and will remain on the treatment and continue follow-up with Dr. Gregory.  I would like to see her again in 6 months around 2025.

## 2024-09-21 NOTE — PHYSICAL EXAM
[Normocephalic] : normocephalic [Atraumatic] : atraumatic [EOMI] : extra ocular movement intact [Supple] : supple [No Supraclavicular Adenopathy] : no supraclavicular adenopathy [No Cervical Adenopathy] : no cervical adenopathy [Examined in the supine and seated position] : examined in the supine and seated position [No dominant masses] : no dominant masses in right breast  [No dominant masses] : no dominant masses left breast [No Nipple Retraction] : no left nipple retraction [No Nipple Discharge] : no left nipple discharge [Breast Mass Right Breast ___cm] : no masses [Breast Mass Left Breast ___cm] : no masses [Breast Nipple Inversion] : nipples not inverted [Breast Nipple Retraction] : nipples not retracted [Breast Nipple Flattening] : nipples not flattened [Breast Nipple Fissures] : nipples not fissured [Breast Abnormal Lactation (Galactorrhea)] : no galactorrhea [Breast Abnormal Secretion Bloody Fluid] : no bloody discharge [Breast Abnormal Secretion Serous Fluid] : no serous discharge [Breast Abnormal Secretion Opalescent Fluid] : no milky discharge [No Axillary Lymphadenopathy] : no left axillary lymphadenopathy [No Edema] : no edema [No Rashes] : no rashes [No Ulceration] : no ulceration [de-identified] : On exam, the patient has obvious bilateral nipple sparing mastectomies and direct to implant reconstructions with this history of recurrent left axillary curtis metastasis in a BRCA2 positive patient.  I cannot feel any suspicious findings over either implant.  She has healed well from her left axillary lymph node dissection and her prior axillary cording completely resolved by going to the gym and performing aggressive range of motion exercise.  She has no evidence of recurrence.  She has no axillary, supraclavicular, or cervical adenopathy. [de-identified] : Status post prophylactic nipple sparing mastectomy with direct to implant reconstruction with no suspicious findings. [de-identified] : Status post nipple sparing mastectomy with direct to implant reconstruction and now status post recent left lower axillary lymph node dissection.  Her axillary wound has healed well and she has excellent range of motion and no evidence of recurrence

## 2024-09-26 ENCOUNTER — NON-APPOINTMENT (OUTPATIENT)
Age: 64
End: 2024-09-26

## 2024-10-11 ENCOUNTER — APPOINTMENT (OUTPATIENT)
Dept: BREAST CENTER | Facility: CLINIC | Age: 64
End: 2024-10-11

## 2024-10-11 VITALS
WEIGHT: 160 LBS | HEART RATE: 82 BPM | SYSTOLIC BLOOD PRESSURE: 108 MMHG | HEIGHT: 63 IN | BODY MASS INDEX: 28.35 KG/M2 | OXYGEN SATURATION: 97 % | DIASTOLIC BLOOD PRESSURE: 60 MMHG

## 2024-10-11 DIAGNOSIS — Z85.118 PERSONAL HISTORY OF OTHER MALIGNANT NEOPLASM OF BRONCHUS AND LUNG: ICD-10-CM

## 2024-10-11 DIAGNOSIS — Z15.09 GENETIC SUSCEPTIBILITY TO MALIGNANT NEOPLASM OF BREAST: ICD-10-CM

## 2024-10-11 DIAGNOSIS — Z12.39 ENCOUNTER FOR OTHER SCREENING FOR MALIGNANT NEOPLASM OF BREAST: ICD-10-CM

## 2024-10-11 DIAGNOSIS — Z15.01 GENETIC SUSCEPTIBILITY TO MALIGNANT NEOPLASM OF BREAST: ICD-10-CM

## 2024-10-11 DIAGNOSIS — Z90.13 ACQUIRED ABSENCE OF BILATERAL BREASTS AND NIPPLES: ICD-10-CM

## 2024-10-11 DIAGNOSIS — Z85.3 PERSONAL HISTORY OF MALIGNANT NEOPLASM OF BREAST: ICD-10-CM

## 2024-10-11 PROCEDURE — 99213 OFFICE O/P EST LOW 20 MIN: CPT

## 2024-11-06 ENCOUNTER — RESULT REVIEW (OUTPATIENT)
Age: 64
End: 2024-11-06

## 2024-11-06 ENCOUNTER — NON-APPOINTMENT (OUTPATIENT)
Age: 64
End: 2024-11-06

## 2024-11-06 ENCOUNTER — APPOINTMENT (OUTPATIENT)
Dept: HEMATOLOGY ONCOLOGY | Facility: CLINIC | Age: 64
End: 2024-11-06
Payer: COMMERCIAL

## 2024-11-06 VITALS
TEMPERATURE: 97.6 F | RESPIRATION RATE: 16 BRPM | OXYGEN SATURATION: 98 % | HEART RATE: 81 BPM | HEIGHT: 63 IN | SYSTOLIC BLOOD PRESSURE: 107 MMHG | DIASTOLIC BLOOD PRESSURE: 62 MMHG

## 2024-11-06 DIAGNOSIS — Z90.13 ACQUIRED ABSENCE OF BILATERAL BREASTS AND NIPPLES: ICD-10-CM

## 2024-11-06 DIAGNOSIS — Z17.0 ESTROGEN RECEPTOR POSITIVE STATUS [ER+]: ICD-10-CM

## 2024-11-06 DIAGNOSIS — C50.912 MALIGNANT NEOPLASM OF UNSPECIFIED SITE OF LEFT FEMALE BREAST: ICD-10-CM

## 2024-11-06 DIAGNOSIS — Z15.09 GENETIC SUSCEPTIBILITY TO MALIGNANT NEOPLASM OF BREAST: ICD-10-CM

## 2024-11-06 DIAGNOSIS — C77.3 MALIGNANT NEOPLASM OF UNSPECIFIED SITE OF LEFT FEMALE BREAST: ICD-10-CM

## 2024-11-06 DIAGNOSIS — M81.0 AGE-RELATED OSTEOPOROSIS W/OUT CURRENT PATHOLOGICAL FRACTURE: ICD-10-CM

## 2024-11-06 DIAGNOSIS — Z15.01 GENETIC SUSCEPTIBILITY TO MALIGNANT NEOPLASM OF BREAST: ICD-10-CM

## 2024-11-06 PROCEDURE — 99214 OFFICE O/P EST MOD 30 MIN: CPT

## 2024-11-06 PROCEDURE — 36415 COLL VENOUS BLD VENIPUNCTURE: CPT

## 2024-11-06 PROCEDURE — G2211 COMPLEX E/M VISIT ADD ON: CPT | Mod: NC

## 2024-11-27 ENCOUNTER — NON-APPOINTMENT (OUTPATIENT)
Age: 64
End: 2024-11-27

## 2025-03-03 ENCOUNTER — RESULT REVIEW (OUTPATIENT)
Age: 65
End: 2025-03-03

## 2025-03-03 ENCOUNTER — APPOINTMENT (OUTPATIENT)
Dept: HEMATOLOGY ONCOLOGY | Facility: CLINIC | Age: 65
End: 2025-03-03
Payer: COMMERCIAL

## 2025-03-03 VITALS
OXYGEN SATURATION: 98 % | WEIGHT: 160 LBS | SYSTOLIC BLOOD PRESSURE: 121 MMHG | BODY MASS INDEX: 28.35 KG/M2 | HEIGHT: 63 IN | HEART RATE: 82 BPM | DIASTOLIC BLOOD PRESSURE: 65 MMHG | TEMPERATURE: 98 F | RESPIRATION RATE: 16 BRPM

## 2025-03-03 DIAGNOSIS — M81.0 AGE-RELATED OSTEOPOROSIS W/OUT CURRENT PATHOLOGICAL FRACTURE: ICD-10-CM

## 2025-03-03 DIAGNOSIS — C34.90 MALIGNANT NEOPLASM OF UNSPECIFIED PART OF UNSPECIFIED BRONCHUS OR LUNG: ICD-10-CM

## 2025-03-03 DIAGNOSIS — Z90.13 ACQUIRED ABSENCE OF BILATERAL BREASTS AND NIPPLES: ICD-10-CM

## 2025-03-03 DIAGNOSIS — Z17.0 MALIGNANT NEOPLASM OF OVERLAPPING SITES OF UNSPECIFIED FEMALE BREAST: ICD-10-CM

## 2025-03-03 DIAGNOSIS — Z15.09 GENETIC SUSCEPTIBILITY TO MALIGNANT NEOPLASM OF BREAST: ICD-10-CM

## 2025-03-03 DIAGNOSIS — M25.559 PAIN IN UNSPECIFIED HIP: ICD-10-CM

## 2025-03-03 DIAGNOSIS — C77.3 MALIGNANT NEOPLASM OF UNSPECIFIED SITE OF LEFT FEMALE BREAST: ICD-10-CM

## 2025-03-03 DIAGNOSIS — R11.0 NAUSEA: ICD-10-CM

## 2025-03-03 DIAGNOSIS — Z15.01 GENETIC SUSCEPTIBILITY TO MALIGNANT NEOPLASM OF BREAST: ICD-10-CM

## 2025-03-03 DIAGNOSIS — R05.9 COUGH, UNSPECIFIED: ICD-10-CM

## 2025-03-03 DIAGNOSIS — C50.912 MALIGNANT NEOPLASM OF UNSPECIFIED SITE OF LEFT FEMALE BREAST: ICD-10-CM

## 2025-03-03 DIAGNOSIS — C50.819 MALIGNANT NEOPLASM OF OVERLAPPING SITES OF UNSPECIFIED FEMALE BREAST: ICD-10-CM

## 2025-03-03 DIAGNOSIS — Z17.0 ESTROGEN RECEPTOR POSITIVE STATUS [ER+]: ICD-10-CM

## 2025-03-03 DIAGNOSIS — R07.9 CHEST PAIN, UNSPECIFIED: ICD-10-CM

## 2025-03-03 PROCEDURE — 99215 OFFICE O/P EST HI 40 MIN: CPT

## 2025-03-03 PROCEDURE — 36415 COLL VENOUS BLD VENIPUNCTURE: CPT

## 2025-03-03 RX ORDER — ONDANSETRON 4 MG/1
4 TABLET, ORALLY DISINTEGRATING ORAL
Qty: 120 | Refills: 0 | Status: ACTIVE | COMMUNITY
Start: 2025-03-03 | End: 1900-01-01

## 2025-03-18 ENCOUNTER — NON-APPOINTMENT (OUTPATIENT)
Age: 65
End: 2025-03-18

## 2025-03-24 ENCOUNTER — APPOINTMENT (OUTPATIENT)
Dept: HEMATOLOGY ONCOLOGY | Facility: CLINIC | Age: 65
End: 2025-03-24

## 2025-03-26 ENCOUNTER — RESULT REVIEW (OUTPATIENT)
Age: 65
End: 2025-03-26

## 2025-03-26 DIAGNOSIS — Z17.0 ESTROGEN RECEPTOR POSITIVE STATUS [ER+]: ICD-10-CM

## 2025-03-26 DIAGNOSIS — F41.9 ANXIETY DISORDER, UNSPECIFIED: ICD-10-CM

## 2025-03-26 DIAGNOSIS — M81.0 AGE-RELATED OSTEOPOROSIS W/OUT CURRENT PATHOLOGICAL FRACTURE: ICD-10-CM

## 2025-03-26 DIAGNOSIS — Z17.0 MALIGNANT NEOPLASM OF OVERLAPPING SITES OF UNSPECIFIED FEMALE BREAST: ICD-10-CM

## 2025-03-26 DIAGNOSIS — C50.819 MALIGNANT NEOPLASM OF OVERLAPPING SITES OF UNSPECIFIED FEMALE BREAST: ICD-10-CM

## 2025-03-26 RX ORDER — ALPRAZOLAM 1 MG/1
1 TABLET ORAL
Qty: 30 | Refills: 0 | Status: ACTIVE | COMMUNITY
Start: 2025-03-26 | End: 1900-01-01

## 2025-04-23 ENCOUNTER — APPOINTMENT (OUTPATIENT)
Dept: RADIATION ONCOLOGY | Facility: CLINIC | Age: 65
End: 2025-04-23
Payer: COMMERCIAL

## 2025-04-23 PROCEDURE — 99215 OFFICE O/P EST HI 40 MIN: CPT

## 2025-04-25 ENCOUNTER — APPOINTMENT (OUTPATIENT)
Dept: BREAST CENTER | Facility: CLINIC | Age: 65
End: 2025-04-25
Payer: COMMERCIAL

## 2025-04-25 VITALS
WEIGHT: 163 LBS | HEIGHT: 64 IN | HEART RATE: 74 BPM | BODY MASS INDEX: 27.83 KG/M2 | OXYGEN SATURATION: 98 % | DIASTOLIC BLOOD PRESSURE: 71 MMHG | SYSTOLIC BLOOD PRESSURE: 104 MMHG

## 2025-04-25 DIAGNOSIS — Z15.01 GENETIC SUSCEPTIBILITY TO MALIGNANT NEOPLASM OF BREAST: ICD-10-CM

## 2025-04-25 DIAGNOSIS — Z12.39 ENCOUNTER FOR OTHER SCREENING FOR MALIGNANT NEOPLASM OF BREAST: ICD-10-CM

## 2025-04-25 DIAGNOSIS — Z85.118 PERSONAL HISTORY OF OTHER MALIGNANT NEOPLASM OF BRONCHUS AND LUNG: ICD-10-CM

## 2025-04-25 DIAGNOSIS — Z15.09 GENETIC SUSCEPTIBILITY TO MALIGNANT NEOPLASM OF BREAST: ICD-10-CM

## 2025-04-25 DIAGNOSIS — Z90.13 ACQUIRED ABSENCE OF BILATERAL BREASTS AND NIPPLES: ICD-10-CM

## 2025-04-25 PROCEDURE — 99213 OFFICE O/P EST LOW 20 MIN: CPT

## 2025-05-12 ENCOUNTER — NON-APPOINTMENT (OUTPATIENT)
Age: 65
End: 2025-05-12

## 2025-05-13 RX ORDER — LORAZEPAM 0.5 MG/1
0.5 TABLET ORAL
Qty: 30 | Refills: 0 | Status: ACTIVE | COMMUNITY
Start: 2025-05-12

## 2025-05-14 ENCOUNTER — NON-APPOINTMENT (OUTPATIENT)
Age: 65
End: 2025-05-14

## 2025-05-14 DIAGNOSIS — C34.90 MALIGNANT NEOPLASM OF UNSPECIFIED PART OF UNSPECIFIED BRONCHUS OR LUNG: ICD-10-CM

## 2025-05-20 ENCOUNTER — RX RENEWAL (OUTPATIENT)
Age: 65
End: 2025-05-20

## 2025-06-23 ENCOUNTER — NON-APPOINTMENT (OUTPATIENT)
Age: 65
End: 2025-06-23

## 2025-06-25 ENCOUNTER — RESULT REVIEW (OUTPATIENT)
Age: 65
End: 2025-06-25

## 2025-06-25 ENCOUNTER — APPOINTMENT (OUTPATIENT)
Dept: HEMATOLOGY ONCOLOGY | Facility: CLINIC | Age: 65
End: 2025-06-25
Payer: COMMERCIAL

## 2025-06-25 VITALS
TEMPERATURE: 97.1 F | BODY MASS INDEX: 27.31 KG/M2 | HEART RATE: 69 BPM | WEIGHT: 160 LBS | OXYGEN SATURATION: 100 % | DIASTOLIC BLOOD PRESSURE: 61 MMHG | RESPIRATION RATE: 16 BRPM | SYSTOLIC BLOOD PRESSURE: 107 MMHG | HEIGHT: 64 IN

## 2025-06-25 PROCEDURE — 99214 OFFICE O/P EST MOD 30 MIN: CPT

## 2025-06-25 PROCEDURE — 36415 COLL VENOUS BLD VENIPUNCTURE: CPT

## 2025-06-25 PROCEDURE — G2211 COMPLEX E/M VISIT ADD ON: CPT | Mod: NC

## 2025-07-09 ENCOUNTER — APPOINTMENT (OUTPATIENT)
Dept: RADIATION ONCOLOGY | Facility: CLINIC | Age: 65
End: 2025-07-09
Payer: COMMERCIAL

## 2025-07-09 PROCEDURE — G2211 COMPLEX E/M VISIT ADD ON: CPT | Mod: NC

## 2025-07-09 PROCEDURE — 99214 OFFICE O/P EST MOD 30 MIN: CPT
